# Patient Record
Sex: MALE | Race: WHITE | Employment: OTHER | URBAN - NONMETROPOLITAN AREA
[De-identification: names, ages, dates, MRNs, and addresses within clinical notes are randomized per-mention and may not be internally consistent; named-entity substitution may affect disease eponyms.]

---

## 2018-01-01 ENCOUNTER — ANESTHESIA (OUTPATIENT)
Dept: OPERATING ROOM | Age: 53
DRG: 853 | End: 2018-01-01
Payer: MEDICARE

## 2018-01-01 ENCOUNTER — PREP FOR PROCEDURE (OUTPATIENT)
Dept: SURGERY | Age: 53
End: 2018-01-01

## 2018-01-01 ENCOUNTER — APPOINTMENT (OUTPATIENT)
Dept: GENERAL RADIOLOGY | Age: 53
DRG: 853 | End: 2018-01-01
Attending: SURGERY
Payer: MEDICARE

## 2018-01-01 ENCOUNTER — HOSPITAL ENCOUNTER (INPATIENT)
Age: 53
LOS: 1 days | DRG: 853 | End: 2018-02-13
Attending: SURGERY | Admitting: SURGERY
Payer: MEDICARE

## 2018-01-01 ENCOUNTER — ANESTHESIA EVENT (OUTPATIENT)
Dept: OPERATING ROOM | Age: 53
DRG: 853 | End: 2018-01-01
Payer: MEDICARE

## 2018-01-01 VITALS
OXYGEN SATURATION: 100 % | DIASTOLIC BLOOD PRESSURE: 79 MMHG | RESPIRATION RATE: 16 BRPM | TEMPERATURE: 95.8 F | SYSTOLIC BLOOD PRESSURE: 123 MMHG

## 2018-01-01 VITALS
HEIGHT: 68 IN | OXYGEN SATURATION: 75 % | BODY MASS INDEX: 35.83 KG/M2 | WEIGHT: 236.4 LBS | DIASTOLIC BLOOD PRESSURE: 37 MMHG | TEMPERATURE: 99.1 F | SYSTOLIC BLOOD PRESSURE: 72 MMHG | RESPIRATION RATE: 22 BRPM

## 2018-01-01 LAB
ABO/RH: NORMAL
ALBUMIN SERPL-MCNC: 1.4 G/DL (ref 3.5–5.2)
ALBUMIN SERPL-MCNC: 1.4 G/DL (ref 3.5–5.2)
ALBUMIN SERPL-MCNC: 2.6 G/DL (ref 3.5–5.2)
ALP BLD-CCNC: 100 U/L (ref 40–130)
ALP BLD-CCNC: 123 U/L (ref 40–130)
ALP BLD-CCNC: 76 U/L (ref 40–130)
ALT SERPL-CCNC: 1510 U/L (ref 5–41)
ALT SERPL-CCNC: 3963 U/L (ref 5–41)
ALT SERPL-CCNC: 70 U/L (ref 5–41)
ANION GAP SERPL CALCULATED.3IONS-SCNC: 17 MMOL/L (ref 7–19)
ANION GAP SERPL CALCULATED.3IONS-SCNC: 18 MMOL/L (ref 7–19)
ANION GAP SERPL CALCULATED.3IONS-SCNC: 20 MMOL/L (ref 7–19)
ANTIBODY SCREEN: NORMAL
AST SERPL-CCNC: 2227 U/L (ref 5–40)
AST SERPL-CCNC: 6977 U/L (ref 5–40)
AST SERPL-CCNC: 97 U/L (ref 5–40)
ATYPICAL LYMPHOCYTE RELATIVE PERCENT: 3 % (ref 0–8)
BACTERIA: ABNORMAL /HPF
BANDED NEUTROPHILS RELATIVE PERCENT: 33 % (ref 0–5)
BASE EXCESS ARTERIAL: -10.2 MMOL/L (ref -2–2)
BASE EXCESS ARTERIAL: -11 (ref -3–3)
BASE EXCESS ARTERIAL: -12 (ref -3–3)
BASE EXCESS ARTERIAL: -12.5 MMOL/L (ref -2–2)
BASE EXCESS ARTERIAL: -12.7 MMOL/L (ref -2–2)
BASE EXCESS ARTERIAL: -14.1 MMOL/L (ref -2–2)
BASE EXCESS ARTERIAL: -6 (ref -3–3)
BASE EXCESS ARTERIAL: -7.7 MMOL/L (ref -2–2)
BASE EXCESS ARTERIAL: -8 MMOL/L (ref -2–2)
BASE EXCESS ARTERIAL: -8.1 MMOL/L (ref -2–2)
BASE EXCESS ARTERIAL: -8.1 MMOL/L (ref -2–2)
BASE EXCESS ARTERIAL: -8.7 MMOL/L (ref -2–2)
BASE EXCESS ARTERIAL: -9.2 MMOL/L (ref -2–2)
BASOPHILS ABSOLUTE: 0 K/UL (ref 0–0.2)
BASOPHILS ABSOLUTE: 0.1 K/UL (ref 0–0.2)
BASOPHILS MANUAL: 0 %
BASOPHILS RELATIVE PERCENT: 0 % (ref 0–1)
BASOPHILS RELATIVE PERCENT: 0.4 % (ref 0–1)
BILIRUB SERPL-MCNC: 0.9 MG/DL (ref 0.2–1.2)
BILIRUB SERPL-MCNC: 1.8 MG/DL (ref 0.2–1.2)
BILIRUB SERPL-MCNC: 2 MG/DL (ref 0.2–1.2)
BILIRUBIN URINE: ABNORMAL
BLOOD, URINE: ABNORMAL
BUN BLDV-MCNC: 40 MG/DL (ref 6–20)
BUN BLDV-MCNC: 43 MG/DL (ref 6–20)
BUN BLDV-MCNC: 46 MG/DL (ref 6–20)
BUN BLDV-MCNC: 47 MG/DL (ref 6–20)
BUN BLDV-MCNC: 50 MG/DL (ref 6–20)
BURR CELLS: ABNORMAL
CALCIUM IONIZED: 0.84 MMOL/L (ref 1.1–1.3)
CALCIUM IONIZED: 1.08 MMOL/L (ref 1.1–1.3)
CALCIUM IONIZED: 1.09 MMOL/L (ref 1.1–1.3)
CALCIUM SERPL-MCNC: 5.9 MG/DL (ref 8.6–10)
CALCIUM SERPL-MCNC: 5.9 MG/DL (ref 8.6–10)
CALCIUM SERPL-MCNC: 6.3 MG/DL (ref 8.6–10)
CALCIUM SERPL-MCNC: 7.4 MG/DL (ref 8.6–10)
CALCIUM SERPL-MCNC: 8.2 MG/DL (ref 8.6–10)
CARBOXYHEMOGLOBIN ARTERIAL: 0.4 % (ref 0–5)
CARBOXYHEMOGLOBIN ARTERIAL: 0.5 % (ref 0–5)
CARBOXYHEMOGLOBIN ARTERIAL: 0.5 % (ref 0–5)
CARBOXYHEMOGLOBIN ARTERIAL: 0.6 % (ref 0–5)
CARBOXYHEMOGLOBIN ARTERIAL: 0.6 % (ref 0–5)
CARBOXYHEMOGLOBIN ARTERIAL: 0.7 % (ref 0–5)
CARBOXYHEMOGLOBIN ARTERIAL: 0.8 % (ref 0–5)
CARBOXYHEMOGLOBIN ARTERIAL: 0.8 % (ref 0–5)
CARBOXYHEMOGLOBIN ARTERIAL: 1.4 % (ref 0–5)
CARBOXYHEMOGLOBIN ARTERIAL: 1.6 % (ref 0–5)
CHLORIDE BLD-SCNC: 84 MMOL/L (ref 98–111)
CHLORIDE BLD-SCNC: 87 MMOL/L (ref 98–111)
CHLORIDE BLD-SCNC: 90 MMOL/L (ref 98–111)
CHLORIDE BLD-SCNC: 94 MMOL/L (ref 98–111)
CHLORIDE BLD-SCNC: 96 MMOL/L (ref 98–111)
CLARITY: ABNORMAL
CO2: 17 MEQ/L (ref 21–32)
CO2: 18 MMOL/L (ref 22–29)
CO2: 19 MEQ/L (ref 21–32)
CO2: 19 MMOL/L (ref 22–29)
CO2: 19 MMOL/L (ref 22–29)
CO2: 20 MMOL/L (ref 22–29)
CO2: 22 MEQ/L (ref 21–32)
CO2: 24 MMOL/L (ref 22–29)
COLOR: ABNORMAL
CREAT SERPL-MCNC: 1.4 MG/DL (ref 0.5–1.2)
CREAT SERPL-MCNC: 1.5 MG/DL (ref 0.5–1.2)
CREAT SERPL-MCNC: 1.9 MG/DL (ref 0.5–1.2)
CREAT SERPL-MCNC: 2.4 MG/DL (ref 0.5–1.2)
CREAT SERPL-MCNC: 2.7 MG/DL (ref 0.5–1.2)
DOHLE BODIES: ABNORMAL
EOSINOPHILS ABSOLUTE: 0 K/UL (ref 0–0.6)
EOSINOPHILS ABSOLUTE: 0 K/UL (ref 0–0.6)
EOSINOPHILS RELATIVE PERCENT: 0 % (ref 0–5)
EOSINOPHILS RELATIVE PERCENT: 0 % (ref 0–5)
EPITHELIAL CELLS, UA: ABNORMAL /HPF
GFR NON-AFRICAN AMERICAN: 24
GFR NON-AFRICAN AMERICAN: 25
GFR NON-AFRICAN AMERICAN: 29
GFR NON-AFRICAN AMERICAN: 33
GFR NON-AFRICAN AMERICAN: 38
GFR NON-AFRICAN AMERICAN: 49
GFR NON-AFRICAN AMERICAN: 53
GLUCOSE BLD-MCNC: 100 MG/DL (ref 70–99)
GLUCOSE BLD-MCNC: 102 MG/DL (ref 70–99)
GLUCOSE BLD-MCNC: 103 MG/DL (ref 70–99)
GLUCOSE BLD-MCNC: 109 MG/DL (ref 70–99)
GLUCOSE BLD-MCNC: 110 MG/DL (ref 70–99)
GLUCOSE BLD-MCNC: 117 MG/DL (ref 74–109)
GLUCOSE BLD-MCNC: 126 MG/DL (ref 70–99)
GLUCOSE BLD-MCNC: 128 MG/DL (ref 70–99)
GLUCOSE BLD-MCNC: 129 MG/DL (ref 74–109)
GLUCOSE BLD-MCNC: 145 MG/DL (ref 74–109)
GLUCOSE BLD-MCNC: 170 MG/DL (ref 70–99)
GLUCOSE BLD-MCNC: 211 MG/DL (ref 74–109)
GLUCOSE BLD-MCNC: 56 MG/DL (ref 70–99)
GLUCOSE BLD-MCNC: 61 MG/DL (ref 70–99)
GLUCOSE BLD-MCNC: 65 MG/DL (ref 70–99)
GLUCOSE BLD-MCNC: 68 MG/DL (ref 70–99)
GLUCOSE BLD-MCNC: 70 MG/DL (ref 70–99)
GLUCOSE BLD-MCNC: 70 MG/DL (ref 74–109)
GLUCOSE BLD-MCNC: 79 MG/DL (ref 70–99)
GLUCOSE BLD-MCNC: 82 MG/DL (ref 70–99)
GLUCOSE BLD-MCNC: 88 MG/DL (ref 70–99)
GLUCOSE BLD-MCNC: 90 MG/DL (ref 70–99)
GLUCOSE BLD-MCNC: 91 MG/DL (ref 70–99)
GLUCOSE BLD-MCNC: 94 MG/DL (ref 70–99)
GLUCOSE URINE: NEGATIVE MG/DL
HCO3 ARTERIAL: 16 MMOL/L (ref 22–26)
HCO3 ARTERIAL: 16.3 MMOL/L (ref 22–26)
HCO3 ARTERIAL: 17.8 MMOL/L (ref 22–26)
HCO3 ARTERIAL: 17.9 MMOL/L (ref 22–26)
HCO3 ARTERIAL: 18.8 MMOL/L (ref 22–26)
HCO3 ARTERIAL: 19.2 MMOL/L (ref 22–26)
HCO3 ARTERIAL: 19.3 MMOL/L (ref 22–26)
HCO3 ARTERIAL: 19.9 MMOL/L (ref 22–26)
HCO3 ARTERIAL: 20.5 MMOL/L (ref 22–26)
HCO3 ARTERIAL: 22.2 MMOL/L (ref 22–26)
HCT VFR BLD CALC: 27.5 % (ref 42–52)
HCT VFR BLD CALC: 28.9 % (ref 42–52)
HCT VFR BLD CALC: 38 % (ref 42–52)
HCT VFR BLD CALC: 44.3 % (ref 42–52)
HEMOGLOBIN, ART, EXTENDED: 10.5 G/DL (ref 14–18)
HEMOGLOBIN, ART, EXTENDED: 10.6 G/DL (ref 14–18)
HEMOGLOBIN, ART, EXTENDED: 10.8 G/DL (ref 14–18)
HEMOGLOBIN, ART, EXTENDED: 11 G/DL (ref 14–18)
HEMOGLOBIN, ART, EXTENDED: 12.4 G/DL (ref 14–18)
HEMOGLOBIN, ART, EXTENDED: 14.8 G/DL (ref 14–18)
HEMOGLOBIN, ART, EXTENDED: 17 G/DL (ref 14–18)
HEMOGLOBIN, ART, EXTENDED: 8.6 G/DL (ref 14–18)
HEMOGLOBIN, ART, EXTENDED: 9 G/DL (ref 14–18)
HEMOGLOBIN, ART, EXTENDED: 9.1 G/DL (ref 14–18)
HEMOGLOBIN: 10.1 GM/DL (ref 12–18)
HEMOGLOBIN: 11.8 G/DL (ref 14–18)
HEMOGLOBIN: 13.8 G/DL (ref 14–18)
HEMOGLOBIN: 14 GM/DL (ref 12–18)
HEMOGLOBIN: 7.2 GM/DL (ref 12–18)
HEMOGLOBIN: 8.5 G/DL (ref 14–18)
HEMOGLOBIN: 8.7 G/DL (ref 14–18)
HYPOCHROMIA: ABNORMAL
KETONES, URINE: ABNORMAL MG/DL
LACTIC ACID: 11.9 MMOL/L (ref 0.5–1.9)
LACTIC ACID: 13.6 MMOL/L (ref 0.5–1.9)
LACTIC ACID: 8.7 MMOL/L (ref 0.5–1.9)
LACTIC ACID: 9.6 MMOL/L (ref 0.5–1.9)
LEUKOCYTE ESTERASE, URINE: ABNORMAL
LYMPHOCYTES ABSOLUTE: 1.5 K/UL (ref 1.1–4.5)
LYMPHOCYTES ABSOLUTE: 1.7 K/UL (ref 1.1–4.5)
LYMPHOCYTES RELATIVE PERCENT: 12 % (ref 20–40)
LYMPHOCYTES RELATIVE PERCENT: 5.8 % (ref 20–40)
MAGNESIUM: 5.2 MG/DL (ref 1.6–2.6)
MCH RBC QN AUTO: 26.9 PG (ref 27–31)
MCH RBC QN AUTO: 27.4 PG (ref 27–31)
MCH RBC QN AUTO: 27.4 PG (ref 27–31)
MCH RBC QN AUTO: 27.6 PG (ref 27–31)
MCHC RBC AUTO-ENTMCNC: 30.1 G/DL (ref 33–37)
MCHC RBC AUTO-ENTMCNC: 30.9 G/DL (ref 33–37)
MCHC RBC AUTO-ENTMCNC: 31.1 G/DL (ref 33–37)
MCHC RBC AUTO-ENTMCNC: 31.2 G/DL (ref 33–37)
MCV RBC AUTO: 88.1 FL (ref 80–94)
MCV RBC AUTO: 88.2 FL (ref 80–94)
MCV RBC AUTO: 89.3 FL (ref 80–94)
MCV RBC AUTO: 89.5 FL (ref 80–94)
METHEMOGLOBIN ARTERIAL: 2.5 %
METHEMOGLOBIN ARTERIAL: 2.8 %
METHEMOGLOBIN ARTERIAL: 3.2 %
METHEMOGLOBIN ARTERIAL: 3.3 %
METHEMOGLOBIN ARTERIAL: 3.5 %
METHEMOGLOBIN ARTERIAL: 3.5 %
METHEMOGLOBIN ARTERIAL: 3.7 %
METHEMOGLOBIN ARTERIAL: 3.9 %
METHEMOGLOBIN ARTERIAL: 3.9 %
METHEMOGLOBIN ARTERIAL: 4.1 %
MONOCYTES ABSOLUTE: 0.3 K/UL (ref 0–0.9)
MONOCYTES ABSOLUTE: 0.9 K/UL (ref 0–0.9)
MONOCYTES RELATIVE PERCENT: 3 % (ref 0–10)
MONOCYTES RELATIVE PERCENT: 3.3 % (ref 0–10)
NEUTROPHILS ABSOLUTE: 23.3 K/UL (ref 1.5–7.5)
NEUTROPHILS ABSOLUTE: 9.4 K/UL (ref 1.5–7.5)
NEUTROPHILS MANUAL: 49 %
NEUTROPHILS RELATIVE PERCENT: 49 % (ref 50–65)
NEUTROPHILS RELATIVE PERCENT: 89.4 % (ref 50–65)
NITRITE, URINE: POSITIVE
O2 CONTENT ARTERIAL: 10.2 ML/DL
O2 CONTENT ARTERIAL: 11.6 ML/DL
O2 CONTENT ARTERIAL: 12 ML/DL
O2 CONTENT ARTERIAL: 12.9 ML/DL
O2 CONTENT ARTERIAL: 13.6 ML/DL
O2 CONTENT ARTERIAL: 13.9 ML/DL
O2 CONTENT ARTERIAL: 14.1 ML/DL
O2 CONTENT ARTERIAL: 20 ML/DL
O2 CONTENT ARTERIAL: 21.5 ML/DL
O2 CONTENT ARTERIAL: 7.6 ML/DL
O2 SAT, ARTERIAL: 100 % (ref 93–100)
O2 SAT, ARTERIAL: 100 % (ref 93–100)
O2 SAT, ARTERIAL: 59 %
O2 SAT, ARTERIAL: 79.5 %
O2 SAT, ARTERIAL: 80.2 %
O2 SAT, ARTERIAL: 81.1 %
O2 SAT, ARTERIAL: 85 %
O2 SAT, ARTERIAL: 90 %
O2 SAT, ARTERIAL: 90.4 %
O2 SAT, ARTERIAL: 90.7 %
O2 SAT, ARTERIAL: 92.8 %
O2 SAT, ARTERIAL: 93.2 %
O2 SAT, ARTERIAL: 99 % (ref 93–100)
O2 THERAPY: ABNORMAL
PCO2 ARTERIAL: 44 MM HG (ref 35–48)
PCO2 ARTERIAL: 46 MMHG (ref 35–45)
PCO2 ARTERIAL: 47 MMHG (ref 35–45)
PCO2 ARTERIAL: 48 MMHG (ref 35–45)
PCO2 ARTERIAL: 50 MMHG (ref 35–45)
PCO2 ARTERIAL: 51 MMHG (ref 35–45)
PCO2 ARTERIAL: 52 MM HG (ref 35–48)
PCO2 ARTERIAL: 55 MMHG (ref 35–45)
PCO2 ARTERIAL: 66 MMHG (ref 35–45)
PCO2 ARTERIAL: 67 MM HG (ref 35–48)
PCO2 ARTERIAL: 70 MMHG (ref 35–45)
PDW BLD-RTO: 14.5 % (ref 11.5–14.5)
PDW BLD-RTO: 14.5 % (ref 11.5–14.5)
PDW BLD-RTO: 14.7 % (ref 11.5–14.5)
PDW BLD-RTO: 14.9 % (ref 11.5–14.5)
PERFORMED ON: ABNORMAL
PERFORMED ON: NORMAL
PH ARTERIAL: 7.04 (ref 7.35–7.45)
PH ARTERIAL: 7.07 (ref 7.3–7.5)
PH ARTERIAL: 7.11 (ref 7.35–7.45)
PH ARTERIAL: 7.12 (ref 7.35–7.45)
PH ARTERIAL: 7.13 (ref 7.35–7.45)
PH ARTERIAL: 7.18 (ref 7.35–7.45)
PH ARTERIAL: 7.18 (ref 7.35–7.45)
PH ARTERIAL: 7.19 (ref 7.3–7.5)
PH ARTERIAL: 7.2 (ref 7.35–7.45)
PH ARTERIAL: 7.2 (ref 7.35–7.45)
PH ARTERIAL: 7.22 (ref 7.35–7.45)
PH ARTERIAL: 7.23 (ref 7.35–7.45)
PH ARTERIAL: 7.24 (ref 7.3–7.5)
PH UA: 6
PHOSPHORUS: 10.7 MG/DL (ref 2.5–4.5)
PLATELET # BLD: 112 K/UL (ref 130–400)
PLATELET # BLD: 148 K/UL (ref 130–400)
PLATELET # BLD: 248 K/UL (ref 130–400)
PLATELET # BLD: 68 K/UL (ref 130–400)
PLATELET SLIDE REVIEW: ADEQUATE
PMV BLD AUTO: 11 FL (ref 9.4–12.4)
PMV BLD AUTO: 11.4 FL (ref 9.4–12.4)
PMV BLD AUTO: 11.7 FL (ref 9.4–12.4)
PMV BLD AUTO: 12.3 FL (ref 9.4–12.4)
PO2 ARTERIAL: 157 MMHG (ref 80–100)
PO2 ARTERIAL: 206 MM HG (ref 83–108)
PO2 ARTERIAL: 252 MMHG (ref 80–100)
PO2 ARTERIAL: 291 MM HG (ref 83–108)
PO2 ARTERIAL: 34 MMHG (ref 80–100)
PO2 ARTERIAL: 420 MM HG (ref 83–108)
PO2 ARTERIAL: 48 MMHG (ref 80–100)
PO2 ARTERIAL: 56 MMHG (ref 80–100)
PO2 ARTERIAL: 79 MMHG (ref 80–100)
PO2 ARTERIAL: 81 MMHG (ref 80–100)
PO2 ARTERIAL: 89 MMHG (ref 80–100)
POC ANION GAP: 11
POC ANION GAP: 15
POC ANION GAP: 16
POC CHLORIDE: 100 MEQ/L (ref 99–110)
POC CHLORIDE: 93 MEQ/L (ref 99–110)
POC CHLORIDE: 94 MEQ/L (ref 99–110)
POC CREATININE: 2.1 MG/DL (ref 0.3–1.3)
POC CREATININE: 2.8 MG/DL (ref 0.3–1.3)
POC HEMATOCRIT: 21 % (ref 37–52)
POC HEMATOCRIT: 30 % (ref 37–52)
POC HEMATOCRIT: 41 % (ref 37–52)
POC POTASSIUM: 5.6 MEQ/L (ref 3.5–5.1)
POC POTASSIUM: 5.9 MEQ/L (ref 3.5–5.1)
POC POTASSIUM: 6 MEQ/L (ref 3.5–5.1)
POC SAMPLE TYPE: ABNORMAL
POC SODIUM: 124 MEQ/L (ref 136–145)
POC SODIUM: 130 MEQ/L (ref 136–145)
POC SODIUM: 133 MEQ/L (ref 136–145)
POIKILOCYTES: ABNORMAL
POTASSIUM REFLEX MAGNESIUM: 6 MMOL/L (ref 3.5–5)
POTASSIUM SERPL-SCNC: 5.2 MMOL/L (ref 3.5–5)
POTASSIUM SERPL-SCNC: 5.4 MMOL/L (ref 3.5–5)
POTASSIUM SERPL-SCNC: 6.1 MMOL/L (ref 3.5–5)
POTASSIUM SERPL-SCNC: 6.6 MMOL/L (ref 3.5–5)
POTASSIUM, WHOLE BLOOD: 4.5
POTASSIUM, WHOLE BLOOD: 4.9
POTASSIUM, WHOLE BLOOD: 4.9
POTASSIUM, WHOLE BLOOD: 5
POTASSIUM, WHOLE BLOOD: 5
POTASSIUM, WHOLE BLOOD: 5.4
POTASSIUM, WHOLE BLOOD: 5.8
POTASSIUM, WHOLE BLOOD: 5.9
POTASSIUM, WHOLE BLOOD: 6.1
POTASSIUM, WHOLE BLOOD: 7.3
PROTEIN UA: 100 MG/DL
RBC # BLD: 3.08 M/UL (ref 4.7–6.1)
RBC # BLD: 3.23 M/UL (ref 4.7–6.1)
RBC # BLD: 4.31 M/UL (ref 4.7–6.1)
RBC # BLD: 5.03 M/UL (ref 4.7–6.1)
RBC UA: ABNORMAL /HPF (ref 0–2)
RENAL EPITHELIAL, UA: ABNORMAL /HPF
SODIUM BLD-SCNC: 126 MMOL/L (ref 136–145)
SODIUM BLD-SCNC: 126 MMOL/L (ref 136–145)
SODIUM BLD-SCNC: 128 MMOL/L (ref 136–145)
SODIUM BLD-SCNC: 130 MMOL/L (ref 136–145)
SODIUM BLD-SCNC: 132 MMOL/L (ref 136–145)
SPECIFIC GRAVITY UA: 1.03
TCO2 ARTERIAL: 18 MMOL/L
TCO2 ARTERIAL: 21 MMOL/L
TCO2 ARTERIAL: 23 MMOL/L
TOTAL PROTEIN: 2.5 G/DL (ref 6.6–8.7)
TOTAL PROTEIN: 3.2 G/DL (ref 6.6–8.7)
TOTAL PROTEIN: 3.2 G/DL (ref 6.6–8.7)
TOXIC GRANULATION: ABNORMAL
UROBILINOGEN, URINE: 2 E.U./DL
WBC # BLD: 11.5 K/UL (ref 4.8–10.8)
WBC # BLD: 19.8 K/UL (ref 4.8–10.8)
WBC # BLD: 26.1 K/UL (ref 4.8–10.8)
WBC # BLD: 29 K/UL (ref 4.8–10.8)
WBC UA: ABNORMAL /HPF (ref 0–5)

## 2018-01-01 PROCEDURE — 92950 HEART/LUNG RESUSCITATION CPR: CPT

## 2018-01-01 PROCEDURE — 2500000003 HC RX 250 WO HCPCS: Performed by: NURSE ANESTHETIST, CERTIFIED REGISTERED

## 2018-01-01 PROCEDURE — 86901 BLOOD TYPING SEROLOGIC RH(D): CPT

## 2018-01-01 PROCEDURE — 36415 COLL VENOUS BLD VENIPUNCTURE: CPT

## 2018-01-01 PROCEDURE — 86850 RBC ANTIBODY SCREEN: CPT

## 2018-01-01 PROCEDURE — 2780000010 HC IMPLANT OTHER: Performed by: SURGERY

## 2018-01-01 PROCEDURE — 6370000000 HC RX 637 (ALT 250 FOR IP): Performed by: NURSE ANESTHETIST, CERTIFIED REGISTERED

## 2018-01-01 PROCEDURE — 94002 VENT MGMT INPAT INIT DAY: CPT

## 2018-01-01 PROCEDURE — 2500000003 HC RX 250 WO HCPCS: Performed by: SURGERY

## 2018-01-01 PROCEDURE — 6360000002 HC RX W HCPCS: Performed by: SURGERY

## 2018-01-01 PROCEDURE — 2580000003 HC RX 258

## 2018-01-01 PROCEDURE — 82800 BLOOD PH: CPT

## 2018-01-01 PROCEDURE — 36600 WITHDRAWAL OF ARTERIAL BLOOD: CPT

## 2018-01-01 PROCEDURE — 84100 ASSAY OF PHOSPHORUS: CPT

## 2018-01-01 PROCEDURE — 3600000004 HC SURGERY LEVEL 4 BASE: Performed by: SURGERY

## 2018-01-01 PROCEDURE — 2580000003 HC RX 258: Performed by: HOSPITALIST

## 2018-01-01 PROCEDURE — 84132 ASSAY OF SERUM POTASSIUM: CPT

## 2018-01-01 PROCEDURE — P9047 ALBUMIN (HUMAN), 25%, 50ML: HCPCS

## 2018-01-01 PROCEDURE — 44150 REMOVAL OF COLON: CPT | Performed by: SURGERY

## 2018-01-01 PROCEDURE — 88309 TISSUE EXAM BY PATHOLOGIST: CPT

## 2018-01-01 PROCEDURE — 2580000003 HC RX 258: Performed by: INTERNAL MEDICINE

## 2018-01-01 PROCEDURE — 0DTK0ZZ RESECTION OF ASCENDING COLON, OPEN APPROACH: ICD-10-PCS | Performed by: SURGERY

## 2018-01-01 PROCEDURE — 0DTL0ZZ RESECTION OF TRANSVERSE COLON, OPEN APPROACH: ICD-10-PCS | Performed by: SURGERY

## 2018-01-01 PROCEDURE — 6360000002 HC RX W HCPCS

## 2018-01-01 PROCEDURE — P9047 ALBUMIN (HUMAN), 25%, 50ML: HCPCS | Performed by: INTERNAL MEDICINE

## 2018-01-01 PROCEDURE — 82948 REAGENT STRIP/BLOOD GLUCOSE: CPT

## 2018-01-01 PROCEDURE — 2700000000 HC OXYGEN THERAPY PER DAY

## 2018-01-01 PROCEDURE — 86900 BLOOD TYPING SEROLOGIC ABO: CPT

## 2018-01-01 PROCEDURE — 6360000002 HC RX W HCPCS: Performed by: INTERNAL MEDICINE

## 2018-01-01 PROCEDURE — 6360000002 HC RX W HCPCS: Performed by: NURSE ANESTHETIST, CERTIFIED REGISTERED

## 2018-01-01 PROCEDURE — 80053 COMPREHEN METABOLIC PANEL: CPT

## 2018-01-01 PROCEDURE — 83605 ASSAY OF LACTIC ACID: CPT

## 2018-01-01 PROCEDURE — P9045 ALBUMIN (HUMAN), 5%, 250 ML: HCPCS | Performed by: NURSE ANESTHETIST, CERTIFIED REGISTERED

## 2018-01-01 PROCEDURE — 6360000002 HC RX W HCPCS: Performed by: HOSPITALIST

## 2018-01-01 PROCEDURE — 82435 ASSAY OF BLOOD CHLORIDE: CPT

## 2018-01-01 PROCEDURE — 99291 CRITICAL CARE FIRST HOUR: CPT | Performed by: HOSPITALIST

## 2018-01-01 PROCEDURE — 2580000003 HC RX 258: Performed by: SURGERY

## 2018-01-01 PROCEDURE — 83735 ASSAY OF MAGNESIUM: CPT

## 2018-01-01 PROCEDURE — 71045 X-RAY EXAM CHEST 1 VIEW: CPT

## 2018-01-01 PROCEDURE — 2000000000 HC ICU R&B

## 2018-01-01 PROCEDURE — 84295 ASSAY OF SERUM SODIUM: CPT

## 2018-01-01 PROCEDURE — 6370000000 HC RX 637 (ALT 250 FOR IP): Performed by: INTERNAL MEDICINE

## 2018-01-01 PROCEDURE — 99291 CRITICAL CARE FIRST HOUR: CPT | Performed by: INTERNAL MEDICINE

## 2018-01-01 PROCEDURE — 82565 ASSAY OF CREATININE: CPT

## 2018-01-01 PROCEDURE — 2500000003 HC RX 250 WO HCPCS: Performed by: INTERNAL MEDICINE

## 2018-01-01 PROCEDURE — 82330 ASSAY OF CALCIUM: CPT

## 2018-01-01 PROCEDURE — 5A1945Z RESPIRATORY VENTILATION, 24-96 CONSECUTIVE HOURS: ICD-10-PCS | Performed by: INTERNAL MEDICINE

## 2018-01-01 PROCEDURE — 2500000003 HC RX 250 WO HCPCS: Performed by: HOSPITALIST

## 2018-01-01 PROCEDURE — 99024 POSTOP FOLLOW-UP VISIT: CPT | Performed by: SURGERY

## 2018-01-01 PROCEDURE — 3600000014 HC SURGERY LEVEL 4 ADDTL 15MIN: Performed by: SURGERY

## 2018-01-01 PROCEDURE — 3700000000 HC ANESTHESIA ATTENDED CARE: Performed by: SURGERY

## 2018-01-01 PROCEDURE — 0DTH0ZZ RESECTION OF CECUM, OPEN APPROACH: ICD-10-PCS | Performed by: SURGERY

## 2018-01-01 PROCEDURE — 2720000001 HC MISC SURG SUPPLY STERILE $51-500: Performed by: SURGERY

## 2018-01-01 PROCEDURE — 2720000010 HC SURG SUPPLY STERILE: Performed by: SURGERY

## 2018-01-01 PROCEDURE — 81001 URINALYSIS AUTO W/SCOPE: CPT

## 2018-01-01 PROCEDURE — 0DTM0ZZ RESECTION OF DESCENDING COLON, OPEN APPROACH: ICD-10-PCS | Performed by: SURGERY

## 2018-01-01 PROCEDURE — 85014 HEMATOCRIT: CPT

## 2018-01-01 PROCEDURE — 82810 BLOOD GASES O2 SAT ONLY: CPT

## 2018-01-01 PROCEDURE — 85027 COMPLETE CBC AUTOMATED: CPT

## 2018-01-01 PROCEDURE — 82803 BLOOD GASES ANY COMBINATION: CPT

## 2018-01-01 PROCEDURE — 99223 1ST HOSP IP/OBS HIGH 75: CPT | Performed by: SURGERY

## 2018-01-01 PROCEDURE — 94003 VENT MGMT INPAT SUBQ DAY: CPT

## 2018-01-01 PROCEDURE — 2580000003 HC RX 258: Performed by: NURSE ANESTHETIST, CERTIFIED REGISTERED

## 2018-01-01 PROCEDURE — 3700000001 HC ADD 15 MINUTES (ANESTHESIA): Performed by: SURGERY

## 2018-01-01 PROCEDURE — 82374 ASSAY BLOOD CARBON DIOXIDE: CPT

## 2018-01-01 PROCEDURE — 85025 COMPLETE CBC W/AUTO DIFF WBC: CPT

## 2018-01-01 PROCEDURE — C9113 INJ PANTOPRAZOLE SODIUM, VIA: HCPCS | Performed by: INTERNAL MEDICINE

## 2018-01-01 RX ORDER — NICOTINE POLACRILEX 4 MG
15 LOZENGE BUCCAL PRN
Status: DISCONTINUED | OUTPATIENT
Start: 2018-01-01 | End: 2018-01-01 | Stop reason: HOSPADM

## 2018-01-01 RX ORDER — ONDANSETRON 2 MG/ML
4 INJECTION INTRAMUSCULAR; INTRAVENOUS EVERY 6 HOURS PRN
Status: DISCONTINUED | OUTPATIENT
Start: 2018-01-01 | End: 2018-01-01 | Stop reason: HOSPADM

## 2018-01-01 RX ORDER — ALBUMIN (HUMAN) 12.5 G/50ML
SOLUTION INTRAVENOUS
Status: DISCONTINUED
Start: 2018-01-01 | End: 2018-01-01

## 2018-01-01 RX ORDER — DEXTROSE MONOHYDRATE 25 G/50ML
25 INJECTION, SOLUTION INTRAVENOUS ONCE
Status: DISCONTINUED | OUTPATIENT
Start: 2018-01-01 | End: 2018-01-01 | Stop reason: HOSPADM

## 2018-01-01 RX ORDER — MORPHINE SULFATE 4 MG/ML
2 INJECTION, SOLUTION INTRAMUSCULAR; INTRAVENOUS EVERY 30 MIN PRN
Status: DISCONTINUED | OUTPATIENT
Start: 2018-01-01 | End: 2018-01-01 | Stop reason: HOSPADM

## 2018-01-01 RX ORDER — 0.9 % SODIUM CHLORIDE 0.9 %
500 INTRAVENOUS SOLUTION INTRAVENOUS ONCE
Status: COMPLETED | OUTPATIENT
Start: 2018-01-01 | End: 2018-01-01

## 2018-01-01 RX ORDER — DOPAMINE HYDROCHLORIDE 160 MG/100ML
2.5 INJECTION, SOLUTION INTRAVENOUS CONTINUOUS
Status: DISCONTINUED | OUTPATIENT
Start: 2018-01-01 | End: 2018-01-01 | Stop reason: HOSPADM

## 2018-01-01 RX ORDER — SODIUM CHLORIDE 9 MG/ML
INJECTION, SOLUTION INTRAVENOUS CONTINUOUS PRN
Status: DISCONTINUED | OUTPATIENT
Start: 2018-01-01 | End: 2018-01-01 | Stop reason: SDUPTHER

## 2018-01-01 RX ORDER — ALBUMIN, HUMAN INJ 5% 5 %
SOLUTION INTRAVENOUS PRN
Status: DISCONTINUED | OUTPATIENT
Start: 2018-01-01 | End: 2018-01-01 | Stop reason: SDUPTHER

## 2018-01-01 RX ORDER — CALCIUM CHLORIDE 100 MG/ML
INJECTION INTRAVENOUS; INTRAVENTRICULAR PRN
Status: DISCONTINUED | OUTPATIENT
Start: 2018-01-01 | End: 2018-01-01 | Stop reason: SDUPTHER

## 2018-01-01 RX ORDER — DEXTROSE MONOHYDRATE 25 G/50ML
INJECTION, SOLUTION INTRAVENOUS PRN
Status: DISCONTINUED | OUTPATIENT
Start: 2018-01-01 | End: 2018-01-01 | Stop reason: SDUPTHER

## 2018-01-01 RX ORDER — PANTOPRAZOLE SODIUM 40 MG/10ML
40 INJECTION, POWDER, LYOPHILIZED, FOR SOLUTION INTRAVENOUS DAILY
Status: DISCONTINUED | OUTPATIENT
Start: 2018-01-01 | End: 2018-01-01 | Stop reason: HOSPADM

## 2018-01-01 RX ORDER — IPRATROPIUM BROMIDE AND ALBUTEROL SULFATE 2.5; .5 MG/3ML; MG/3ML
1 SOLUTION RESPIRATORY (INHALATION) EVERY 4 HOURS
Status: DISCONTINUED | OUTPATIENT
Start: 2018-01-01 | End: 2018-01-01 | Stop reason: HOSPADM

## 2018-01-01 RX ORDER — SODIUM CHLORIDE 9 MG/ML
INJECTION, SOLUTION INTRAVENOUS CONTINUOUS
Status: DISCONTINUED | OUTPATIENT
Start: 2018-01-01 | End: 2018-01-01

## 2018-01-01 RX ORDER — DEXTROSE MONOHYDRATE 25 G/50ML
25 INJECTION, SOLUTION INTRAVENOUS ONCE
Status: COMPLETED | OUTPATIENT
Start: 2018-01-01 | End: 2018-01-01

## 2018-01-01 RX ORDER — SODIUM CHLORIDE 0.9 % (FLUSH) 0.9 %
10 SYRINGE (ML) INJECTION EVERY 12 HOURS SCHEDULED
Status: DISCONTINUED | OUTPATIENT
Start: 2018-01-01 | End: 2018-01-01 | Stop reason: HOSPADM

## 2018-01-01 RX ORDER — DIVALPROEX SODIUM 250 MG/1
250 TABLET, DELAYED RELEASE ORAL
Status: ON HOLD | COMMUNITY
End: 2018-01-01 | Stop reason: HOSPADM

## 2018-01-01 RX ORDER — CALCIUM CHLORIDE 100 MG/ML
INJECTION INTRAVENOUS; INTRAVENTRICULAR DAILY PRN
Status: COMPLETED | OUTPATIENT
Start: 2018-01-01 | End: 2018-01-01

## 2018-01-01 RX ORDER — PROPOFOL 10 MG/ML
10 INJECTION, EMULSION INTRAVENOUS
Status: DISCONTINUED | OUTPATIENT
Start: 2018-01-01 | End: 2018-01-01 | Stop reason: HOSPADM

## 2018-01-01 RX ORDER — ATORVASTATIN CALCIUM 40 MG/1
40 TABLET, FILM COATED ORAL DAILY
Status: ON HOLD | COMMUNITY
End: 2018-01-01 | Stop reason: HOSPADM

## 2018-01-01 RX ORDER — ACETAMINOPHEN 325 MG/1
650 TABLET ORAL EVERY 4 HOURS PRN
Status: DISCONTINUED | OUTPATIENT
Start: 2018-01-01 | End: 2018-01-01 | Stop reason: HOSPADM

## 2018-01-01 RX ORDER — ALBUMIN (HUMAN) 12.5 G/50ML
50 SOLUTION INTRAVENOUS ONCE
Status: COMPLETED | OUTPATIENT
Start: 2018-01-01 | End: 2018-01-01

## 2018-01-01 RX ORDER — ROCURONIUM BROMIDE 10 MG/ML
INJECTION, SOLUTION INTRAVENOUS PRN
Status: DISCONTINUED | OUTPATIENT
Start: 2018-01-01 | End: 2018-01-01 | Stop reason: SDUPTHER

## 2018-01-01 RX ORDER — DEXTROSE MONOHYDRATE 25 G/50ML
25 INJECTION, SOLUTION INTRAVENOUS PRN
Status: DISCONTINUED | OUTPATIENT
Start: 2018-01-01 | End: 2018-01-01 | Stop reason: HOSPADM

## 2018-01-01 RX ORDER — ALBUMIN (HUMAN) 12.5 G/50ML
SOLUTION INTRAVENOUS
Status: COMPLETED
Start: 2018-01-01 | End: 2018-01-01

## 2018-01-01 RX ORDER — ALBUMIN (HUMAN) 12.5 G/50ML
25 SOLUTION INTRAVENOUS EVERY 6 HOURS
Status: DISCONTINUED | OUTPATIENT
Start: 2018-01-01 | End: 2018-01-01 | Stop reason: HOSPADM

## 2018-01-01 RX ORDER — DEXTROSE MONOHYDRATE 25 G/50ML
INJECTION, SOLUTION INTRAVENOUS DAILY PRN
Status: COMPLETED | OUTPATIENT
Start: 2018-01-01 | End: 2018-01-01

## 2018-01-01 RX ORDER — DEXTROSE MONOHYDRATE 50 MG/ML
100 INJECTION, SOLUTION INTRAVENOUS PRN
Status: DISCONTINUED | OUTPATIENT
Start: 2018-01-01 | End: 2018-01-01 | Stop reason: HOSPADM

## 2018-01-01 RX ORDER — SODIUM CHLORIDE 0.9 % (FLUSH) 0.9 %
10 SYRINGE (ML) INJECTION PRN
Status: DISCONTINUED | OUTPATIENT
Start: 2018-01-01 | End: 2018-01-01 | Stop reason: HOSPADM

## 2018-01-01 RX ORDER — DEXTROSE MONOHYDRATE 25 G/50ML
12.5 INJECTION, SOLUTION INTRAVENOUS PRN
Status: DISCONTINUED | OUTPATIENT
Start: 2018-01-01 | End: 2018-01-01 | Stop reason: HOSPADM

## 2018-01-01 RX ORDER — ALBUTEROL SULFATE 0.63 MG/3ML
1 SOLUTION RESPIRATORY (INHALATION)
Status: ON HOLD | COMMUNITY
End: 2018-01-01 | Stop reason: HOSPADM

## 2018-01-01 RX ORDER — DEXTROSE MONOHYDRATE 25 G/50ML
INJECTION, SOLUTION INTRAVENOUS
Status: COMPLETED
Start: 2018-01-01 | End: 2018-01-01

## 2018-01-01 RX ORDER — DOBUTAMINE HYDROCHLORIDE 200 MG/100ML
2.5 INJECTION INTRAVENOUS CONTINUOUS
Status: DISCONTINUED | OUTPATIENT
Start: 2018-01-01 | End: 2018-01-01 | Stop reason: HOSPADM

## 2018-01-01 RX ORDER — CHLORHEXIDINE GLUCONATE 0.12 MG/ML
15 RINSE ORAL 2 TIMES DAILY
Status: DISCONTINUED | OUTPATIENT
Start: 2018-01-01 | End: 2018-01-01 | Stop reason: HOSPADM

## 2018-01-01 RX ORDER — MIDAZOLAM HYDROCHLORIDE 1 MG/ML
INJECTION INTRAMUSCULAR; INTRAVENOUS PRN
Status: DISCONTINUED | OUTPATIENT
Start: 2018-01-01 | End: 2018-01-01 | Stop reason: SDUPTHER

## 2018-01-01 RX ORDER — PROPOFOL 10 MG/ML
INJECTION, EMULSION INTRAVENOUS
Status: DISCONTINUED
Start: 2018-01-01 | End: 2018-01-01

## 2018-01-01 RX ADMIN — DOPAMINE HYDROCHLORIDE 10 MCG/KG/MIN: 160 INJECTION, SOLUTION INTRAVENOUS at 06:21

## 2018-01-01 RX ADMIN — INSULIN HUMAN 10 UNITS: 100 INJECTION, SOLUTION PARENTERAL at 08:25

## 2018-01-01 RX ADMIN — SODIUM BICARBONATE 50 MEQ: 84 INJECTION, SOLUTION INTRAVENOUS at 07:17

## 2018-01-01 RX ADMIN — Medication 150 MG: at 08:44

## 2018-01-01 RX ADMIN — SODIUM CHLORIDE 1000 MG: 9 INJECTION, SOLUTION INTRAVENOUS at 05:52

## 2018-01-01 RX ADMIN — HYDROCORTISONE SODIUM SUCCINATE 50 MG: 100 INJECTION, POWDER, FOR SOLUTION INTRAMUSCULAR; INTRAVENOUS at 08:45

## 2018-01-01 RX ADMIN — SODIUM BICARBONATE 50 MEQ: 84 INJECTION, SOLUTION INTRAVENOUS at 06:48

## 2018-01-01 RX ADMIN — MIDAZOLAM HYDROCHLORIDE 2 MG: 1 INJECTION, SOLUTION INTRAMUSCULAR; INTRAVENOUS at 08:29

## 2018-01-01 RX ADMIN — ALBUMIN (HUMAN) 250 ML: 2.5 SOLUTION INTRAVENOUS at 06:51

## 2018-01-01 RX ADMIN — CALCIUM CHLORIDE 0.5 G: 100 INJECTION, SOLUTION INTRAVENOUS at 06:46

## 2018-01-01 RX ADMIN — CALCIUM CHLORIDE 1 G: 100 INJECTION, SOLUTION INTRAVENOUS at 08:28

## 2018-01-01 RX ADMIN — Medication 10 ML: at 10:28

## 2018-01-01 RX ADMIN — ALBUMIN (HUMAN) 25 G: 0.25 INJECTION, SOLUTION INTRAVENOUS at 00:53

## 2018-01-01 RX ADMIN — EPINEPHRINE 0.2 MG: 0.1 INJECTION INTRACARDIAC; INTRAVENOUS at 07:18

## 2018-01-01 RX ADMIN — ALBUMIN (HUMAN) 250 ML: 2.5 SOLUTION INTRAVENOUS at 06:32

## 2018-01-01 RX ADMIN — NOREPINEPHRINE BITARTRATE 15 MCG/MIN: 1 INJECTION INTRAVENOUS at 04:06

## 2018-01-01 RX ADMIN — HYDROCORTISONE SODIUM SUCCINATE 50 MG: 100 INJECTION, POWDER, FOR SOLUTION INTRAMUSCULAR; INTRAVENOUS at 03:32

## 2018-01-01 RX ADMIN — SODIUM BICARBONATE: 84 INJECTION, SOLUTION INTRAVENOUS at 04:59

## 2018-01-01 RX ADMIN — EPINEPHRINE 1 MG: 0.1 INJECTION INTRACARDIAC; INTRAVENOUS at 04:34

## 2018-01-01 RX ADMIN — DEXTROSE MONOHYDRATE 25 G: 500 INJECTION PARENTERAL at 12:22

## 2018-01-01 RX ADMIN — SODIUM BICARBONATE 50 MEQ: 84 INJECTION, SOLUTION INTRAVENOUS at 07:30

## 2018-01-01 RX ADMIN — EPINEPHRINE 1 MG: 0.1 INJECTION INTRACARDIAC; INTRAVENOUS at 04:32

## 2018-01-01 RX ADMIN — ALBUMIN (HUMAN) 50 G: 0.25 INJECTION, SOLUTION INTRAVENOUS at 05:43

## 2018-01-01 RX ADMIN — SODIUM BICARBONATE 50 MEQ: 84 INJECTION, SOLUTION INTRAVENOUS at 08:20

## 2018-01-01 RX ADMIN — INSULIN HUMAN 10 UNITS: 100 INJECTION, SOLUTION PARENTERAL at 07:08

## 2018-01-01 RX ADMIN — CALCIUM CHLORIDE 1 G: 100 INJECTION, SOLUTION INTRAVENOUS at 04:37

## 2018-01-01 RX ADMIN — SODIUM CHLORIDE: 9 INJECTION, SOLUTION INTRAVENOUS at 08:15

## 2018-01-01 RX ADMIN — ROCURONIUM BROMIDE 50 MG: 10 INJECTION INTRAVENOUS at 05:59

## 2018-01-01 RX ADMIN — EPINEPHRINE 0.2 MG: 0.1 INJECTION INTRACARDIAC; INTRAVENOUS at 07:52

## 2018-01-01 RX ADMIN — SODIUM CHLORIDE: 9 INJECTION, SOLUTION INTRAVENOUS at 06:01

## 2018-01-01 RX ADMIN — EPINEPHRINE 0.2 MG: 0.1 INJECTION INTRACARDIAC; INTRAVENOUS at 07:41

## 2018-01-01 RX ADMIN — ROCURONIUM BROMIDE 50 MG: 10 INJECTION INTRAVENOUS at 06:55

## 2018-01-01 RX ADMIN — PANTOPRAZOLE SODIUM 40 MG: 40 INJECTION, POWDER, FOR SOLUTION INTRAVENOUS at 08:45

## 2018-01-01 RX ADMIN — EPINEPHRINE 0.2 MG: 0.1 INJECTION INTRACARDIAC; INTRAVENOUS at 07:30

## 2018-01-01 RX ADMIN — ALBUMIN (HUMAN) 50 G: 0.25 INJECTION, SOLUTION INTRAVENOUS at 21:04

## 2018-01-01 RX ADMIN — CHLORHEXIDINE GLUCONATE 15 ML: 1.2 LIQUID BUCCAL at 22:36

## 2018-01-01 RX ADMIN — SODIUM CHLORIDE 1 G: 9 INJECTION, SOLUTION INTRAVENOUS at 06:26

## 2018-01-01 RX ADMIN — NOREPINEPHRINE BITARTRATE 40 MCG/MIN: 1 INJECTION INTRAVENOUS at 03:23

## 2018-01-01 RX ADMIN — DOPAMINE HYDROCHLORIDE 20 MCG/KG/MIN: 160 INJECTION, SOLUTION INTRAVENOUS at 22:35

## 2018-01-01 RX ADMIN — DEXTROSE MONOHYDRATE 12.5 G: 500 INJECTION PARENTERAL at 07:08

## 2018-01-01 RX ADMIN — NOREPINEPHRINE BITARTRATE 40 MCG/MIN: 1 INJECTION INTRAVENOUS at 19:54

## 2018-01-01 RX ADMIN — SODIUM BICARBONATE 25 MEQ: 84 INJECTION, SOLUTION INTRAVENOUS at 04:30

## 2018-01-01 RX ADMIN — PHENYLEPHRINE HYDROCHLORIDE 275 MCG/MIN: 10 INJECTION INTRAVENOUS at 21:20

## 2018-01-01 RX ADMIN — CALCIUM GLUCONATE 2 G: 98 INJECTION, SOLUTION INTRAVENOUS at 01:22

## 2018-01-01 RX ADMIN — CALCIUM CHLORIDE 1 G: 100 INJECTION, SOLUTION INTRAVENOUS at 04:33

## 2018-01-01 RX ADMIN — SODIUM BICARBONATE 50 MEQ: 84 INJECTION INTRAVENOUS at 10:49

## 2018-01-01 RX ADMIN — DOPAMINE HYDROCHLORIDE 20 MCG/KG/MIN: 160 INJECTION, SOLUTION INTRAVENOUS at 02:12

## 2018-01-01 RX ADMIN — DEXTROSE MONOHYDRATE 25 G: 500 INJECTION PARENTERAL at 08:25

## 2018-01-01 RX ADMIN — DOPAMINE HYDROCHLORIDE 2.5 MCG/KG/MIN: 160 INJECTION, SOLUTION INTRAVENOUS at 17:04

## 2018-01-01 RX ADMIN — SODIUM BICARBONATE 50 MEQ: 84 INJECTION, SOLUTION INTRAVENOUS at 04:36

## 2018-01-01 RX ADMIN — VASOPRESSIN 5 UNITS: 20 INJECTION, SOLUTION INTRAMUSCULAR; SUBCUTANEOUS at 06:09

## 2018-01-01 RX ADMIN — CALCIUM CHLORIDE 0.5 G: 100 INJECTION, SOLUTION INTRAVENOUS at 06:59

## 2018-01-01 RX ADMIN — DEXTROSE MONOHYDRATE 25 G: 25 INJECTION, SOLUTION INTRAVENOUS at 04:59

## 2018-01-01 RX ADMIN — MIDAZOLAM HYDROCHLORIDE 2 MG: 1 INJECTION, SOLUTION INTRAMUSCULAR; INTRAVENOUS at 05:58

## 2018-01-01 RX ADMIN — EPINEPHRINE 0.2 MG: 0.1 INJECTION INTRACARDIAC; INTRAVENOUS at 08:10

## 2018-01-01 RX ADMIN — Medication 0.04 UNITS/MIN: at 09:00

## 2018-01-01 RX ADMIN — Medication 150 MG: at 07:23

## 2018-01-01 RX ADMIN — INSULIN HUMAN 10 UNITS: 100 INJECTION, SOLUTION PARENTERAL at 04:58

## 2018-01-01 RX ADMIN — ALBUMIN (HUMAN) 50 G: 12.5 SOLUTION INTRAVENOUS at 05:43

## 2018-01-01 RX ADMIN — PHENYLEPHRINE HYDROCHLORIDE 150 MCG/MIN: 10 INJECTION INTRAVENOUS at 14:07

## 2018-01-01 RX ADMIN — SODIUM CHLORIDE: 9 INJECTION, SOLUTION INTRAVENOUS at 07:17

## 2018-01-01 RX ADMIN — EPINEPHRINE 2 MCG/MIN: 1 INJECTION INTRAMUSCULAR; INTRAVENOUS; SUBCUTANEOUS at 22:00

## 2018-01-01 RX ADMIN — ROCURONIUM BROMIDE 50 MG: 10 INJECTION INTRAVENOUS at 08:28

## 2018-01-01 RX ADMIN — EPINEPHRINE 0.2 MG: 0.1 INJECTION INTRACARDIAC; INTRAVENOUS at 08:05

## 2018-01-01 RX ADMIN — DEXTROSE MONOHYDRATE: 500 INJECTION PARENTERAL at 12:32

## 2018-01-01 RX ADMIN — PHENYLEPHRINE HYDROCHLORIDE 275 MCG/MIN: 10 INJECTION INTRAVENOUS at 17:34

## 2018-01-01 RX ADMIN — SODIUM BICARBONATE 50 MEQ: 84 INJECTION, SOLUTION INTRAVENOUS at 08:29

## 2018-01-01 RX ADMIN — HYDROCORTISONE SODIUM SUCCINATE 50 MG: 100 INJECTION, POWDER, FOR SOLUTION INTRAMUSCULAR; INTRAVENOUS at 21:39

## 2018-01-01 RX ADMIN — DEXTROSE MONOHYDRATE 12.5 G: 500 INJECTION PARENTERAL at 07:30

## 2018-01-01 RX ADMIN — DOBUTAMINE HYDROCHLORIDE 2.5 MCG/KG/MIN: 200 INJECTION INTRAVENOUS at 01:43

## 2018-01-01 RX ADMIN — EPINEPHRINE 0.2 MG: 0.1 INJECTION INTRACARDIAC; INTRAVENOUS at 08:25

## 2018-01-01 RX ADMIN — SODIUM CHLORIDE 500 ML: 9 INJECTION, SOLUTION INTRAVENOUS at 12:36

## 2018-01-01 RX ADMIN — Medication 10 ML: at 22:36

## 2018-01-01 RX ADMIN — CALCIUM CHLORIDE 1 G: 100 INJECTION, SOLUTION INTRAVENOUS at 07:34

## 2018-01-01 RX ADMIN — MIDAZOLAM HYDROCHLORIDE 2 MG: 1 INJECTION, SOLUTION INTRAMUSCULAR; INTRAVENOUS at 07:03

## 2018-01-01 RX ADMIN — SODIUM CHLORIDE 500 ML: 9 INJECTION, SOLUTION INTRAVENOUS at 17:02

## 2018-01-01 RX ADMIN — NOREPINEPHRINE BITARTRATE 40 MCG/MIN: 1 INJECTION INTRAVENOUS at 08:45

## 2018-01-01 RX ADMIN — SODIUM BICARBONATE: 84 INJECTION, SOLUTION INTRAVENOUS at 01:31

## 2018-01-01 RX ADMIN — SODIUM CHLORIDE: 9 INJECTION, SOLUTION INTRAVENOUS at 08:04

## 2018-01-01 RX ADMIN — ALBUMIN (HUMAN) 25 G: 0.25 INJECTION, SOLUTION INTRAVENOUS at 06:23

## 2018-01-01 RX ADMIN — SODIUM BICARBONATE: 84 INJECTION, SOLUTION INTRAVENOUS at 11:27

## 2018-01-01 RX ADMIN — CALCIUM CHLORIDE 0.5 G: 100 INJECTION, SOLUTION INTRAVENOUS at 06:44

## 2018-01-01 RX ADMIN — SODIUM CHLORIDE: 9 INJECTION, SOLUTION INTRAVENOUS at 04:07

## 2018-01-01 RX ADMIN — Medication 0.04 UNITS/MIN: at 14:40

## 2018-01-01 RX ADMIN — CALCIUM CHLORIDE 0.5 G: 100 INJECTION, SOLUTION INTRAVENOUS at 06:58

## 2018-01-01 RX ADMIN — EPINEPHRINE 1 MG: 0.1 INJECTION INTRACARDIAC; INTRAVENOUS at 04:30

## 2018-01-01 RX ADMIN — SODIUM CHLORIDE: 9 INJECTION, SOLUTION INTRAVENOUS at 07:48

## 2018-01-01 RX ADMIN — Medication 5 UNITS/HR: at 06:21

## 2018-01-01 RX ADMIN — DEXTROSE MONOHYDRATE 25 G: 25 INJECTION, SOLUTION INTRAVENOUS at 04:39

## 2018-01-01 RX ADMIN — NOREPINEPHRINE BITARTRATE 30 MCG/MIN: 1 INJECTION INTRAVENOUS at 06:07

## 2018-01-01 ASSESSMENT — PULMONARY FUNCTION TESTS
PIF_VALUE: 28.4
PIF_VALUE: 36.1
PIF_VALUE: 32.3
PIF_VALUE: 31.5
PIF_VALUE: 28.9
PIF_VALUE: 29.6
PIF_VALUE: 28.6
PIF_VALUE: 27.4
PIF_VALUE: 32.4
PIF_VALUE: 27.4
PIF_VALUE: 34.3
PIF_VALUE: 35.5
PIF_VALUE: 35.3
PIF_VALUE: 34.2
PIF_VALUE: 29.4
PIF_VALUE: 28.6
PIF_VALUE: 33.8
PIF_VALUE: 28.9
PIF_VALUE: 35.4
PIF_VALUE: 32.4
PIF_VALUE: 31.3
PIF_VALUE: 29.1
PIF_VALUE: 35.8
PIF_VALUE: 27.4
PIF_VALUE: 28.8
PIF_VALUE: 31.1
PIF_VALUE: 29.2
PIF_VALUE: 36.2
PIF_VALUE: 29.9
PIF_VALUE: 29.8
PIF_VALUE: 29.4
PIF_VALUE: 32.8
PIF_VALUE: 27.3
PIF_VALUE: 29.6
PIF_VALUE: 31.4
PIF_VALUE: 36.1
PIF_VALUE: 29.5
PIF_VALUE: 29.7
PIF_VALUE: 33.6
PIF_VALUE: 29.7
PIF_VALUE: 32
PIF_VALUE: 28.7
PIF_VALUE: 32.7
PIF_VALUE: 34.7
PIF_VALUE: 30.5
PIF_VALUE: 35.4
PIF_VALUE: 29.1
PIF_VALUE: 31.6
PIF_VALUE: 36.5
PIF_VALUE: 32.7
PIF_VALUE: 33.7
PIF_VALUE: 35.4
PIF_VALUE: 29.1
PIF_VALUE: 35.4
PIF_VALUE: 33.5

## 2018-02-12 PROBLEM — C18.7 MALIGNANT NEOPLASM OF SIGMOID COLON (HCC): Status: ACTIVE | Noted: 2018-01-01

## 2018-02-12 PROBLEM — R65.21 SEPTIC SHOCK DUE TO UNDETERMINED ORGANISM (HCC): Status: ACTIVE | Noted: 2018-01-01

## 2018-02-12 PROBLEM — A41.9 SEPTIC SHOCK DUE TO UNDETERMINED ORGANISM (HCC): Status: ACTIVE | Noted: 2018-01-01

## 2018-02-12 PROBLEM — R56.9 SEIZURE (HCC): Status: ACTIVE | Noted: 2018-01-01

## 2018-02-12 PROBLEM — K63.1 PERFORATION BOWEL (HCC): Status: ACTIVE | Noted: 2018-01-01

## 2018-02-12 PROBLEM — J44.9 COPD (CHRONIC OBSTRUCTIVE PULMONARY DISEASE) (HCC): Status: ACTIVE | Noted: 2018-01-01

## 2018-02-12 PROBLEM — J96.21 ACUTE ON CHRONIC RESPIRATORY FAILURE WITH HYPOXIA (HCC): Status: ACTIVE | Noted: 2018-01-01

## 2018-02-12 NOTE — PROGRESS NOTES
Oxygen saturation probe not picking up accurately on this patient. Earlier this afternoon, it was showing 82% on monitor but ABG revealed O2 sat  94%. Dr. Arabella Mcleod aware of difference and O2 reading on monitor.

## 2018-02-12 NOTE — PROGRESS NOTES
Blood gas, arterial [[id number]] (Abnormal) Collected: 02/12/18 0914      Specimen: Blood gases Updated: 02/12/18 0915      pH, Arterial 7.130 (LL)      pCO2, Arterial 48.0 (H) mmHg       pO2, Arterial 157.0 (H) mmHg       HCO3, Arterial 16.0 (L) mmol/L       Base Excess, Arterial -12.5 (L) mmol/L       Hemoglobin, Art, Extended 8.6 (L) g/dL       O2 Sat, Arterial 92.8 %       Carboxyhgb, Arterial 0.8 %       Methemoglobin, Arterial 3.5 (H) %       O2 Content, Arterial 11.6 mL/dL       O2 Therapy Unknown     Narrative:       CALL  [facility] telSEBASTIÁN Hart RN ICU  Sam Portillo RN ICU, 02/12/2018 09:15, by FARIDEH [facility]     Potassium, Whole Blood [[id number]] Collected: 02/12/18 0914      Updated: 02/12/18 0915      Potassium, Whole Blood 5.0     PRVC 15, , 100% FIO2, 8 PEEP  ABG obtained via ART Line

## 2018-02-12 NOTE — ANESTHESIA PROCEDURE NOTES
Central Venous Line:    A central venous line was placed using ultrasound guidance, in the OR for the following indication(s): central venous access and CVP monitoring. 2/12/2018 6:50 AM2/12/2018 6:50 AM    Sterility preparation included the following: hand hygiene performed prior to procedure, maximum sterile barriers used and sterile technique used to drape from head to toe. The patient was placed in Trendelenburg position. The    The site was prepped with Chloraprep. A 8.5 Fr (size), 11.5 (length), introducer single lumen was placed. During the procedure, the following specific steps were taken: target vein identified, needle advanced into vein and blood aspirated and guidewire advanced into vein. Intravenous verification was obtained by ultrasound and venous blood return. Post insertion care included: all ports aspirated, all ports flushed easily, guidewire removed intact, Biopatch applied, line sutured in place and dressing applied. During the procedure the patient experienced: patient tolerated procedure well with no complications.       Insertion site scrubbed per usage guidelines?: Yes  Skin prep agent dried for 3 minutes prior to procedure?:yes  Anesthesia type: general..No  Staffing  Anesthesiologist: Marcin Sage  Performed: Anesthesiologist   Preanesthetic Checklist  Completed: patient identified, site marked, surgical consent, pre-op evaluation, timeout performed, IV checked, risks and benefits discussed, monitors and equipment checked, anesthesia consent given, oxygen available and patient being monitored

## 2018-02-12 NOTE — PROGRESS NOTES
Hospitalist Progress Note  2/12/2018 5:19 PM  Subjective:   Admit Date: 2/12/2018  PCP: Omari Stone  [id number]      Chief Complaint: Unresponsive. Subjective / History of present illness:  Hypothermic, hypoglycemic (despite change from D5 0.45 NS). Maxxed on NE and vasopressin. Not breathing over the vent, requiring no sedation, no cough or gag. Requires 100% oxygen and 8 of PEEP. Requiring jeffrey hugger. Wife is at bedside for discission. Had almost 1 Liter out via ABThera system in 4 hours post op    Cumulative hospital history:   The patient is a 47year old gentleman who for the past 3 months has had increasing abdominal pain. He decline endo/colonoscopy as recommended by his PCP. He developed abdominal distention and increasing pain and had projectile vomiting. CT from [place], [place]. Lastnight he agreed for exploratory laparotomy and underrwent Ex lap with total abdominal colectomy due to finding a completely obstructing rectosigmoid cancer with perforation of a massively distended colon. Fecal peritonitis also seen    ROS:  Unresponsive    14 point review of systems is negative except as specifically addressed above.     Medications:   norepinephrine 40 mcg/min (02/12/18 1058)    propofol      vasopressin infusion 0.04 Units/min (02/12/18 1440)    IV infusion builder 150 mL/hr at 02/12/18 1236    phenylephrine (MITCHELL-SYNEPHRINE) 50mg/250mL infusion 200 mcg/min (02/12/18 1444)    DOPamine 2.5 mcg/kg/min (02/12/18 1704)     Current Facility-Administered Medications   Medication Dose Route Frequency Provider Last Rate Last Dose    norepinephrine (LEVOPHED) 16 mg in dextrose 5 % 250 mL infusion  2 mcg/min Intravenous Continuous Shellie Hills MD 37.5 mL/hr at 02/12/18 1058 40 mcg/min at 02/12/18 1058    propofol 1000 MG/100ML injection  10 mcg/kg/min Intravenous Titrated Shellie Hills MD        ertapenem [facility]) 1 g IVPB minibag  1 g Intravenous Q24H Lukas Caruso MD   1 g at 02/12/18 0626    micafungin (MYCAMINE) 150 mg in sodium chloride 0.9 % 100 mL IVPB  150 mg Intravenous Daily Ruby Louie MD   150 mg at 02/12/18 0723    vasopressin 20 Units in sodium chloride 0.9 % 100 mL infusion  0.04 Units/min Intravenous Continuous Lukas Caruso MD 12 mL/hr at 02/12/18 1440 0.04 Units/min at 02/12/18 1440    chlorhexidine (PERIDEX) 0.12 % solution 15 mL  15 mL Mouth/Throat BID Lukas Caruso MD        pantoprazole (PROTONIX) injection 40 mg  40 mg Intravenous Daily Lukas Caruso MD        sodium chloride flush 0.9 % injection 10 mL  10 mL Intravenous 2 times per day Lennox Blackman MD   10 mL at 02/12/18 1028    sodium chloride flush 0.9 % injection 10 mL  10 mL Intravenous PRN Lennox Blackman MD        acetaminophen (TYLENOL) tablet 650 mg  650 mg Oral Q4H PRN Lennox Blackman MD        morphine injection 2 mg  2 mg Intravenous Q30 Min PRN Lennox Blackman MD        ondansetron [facility]) injection 4 mg  4 mg Intravenous Q6H PRN MD Candelario Harris ON 2/13/2018] enoxaparin (LOVENOX) injection 40 mg  40 mg Subcutaneous Daily Lennox Blackman MD        sodium bicarbonate 100 mEq in dextrose 5 % and 0.45 % NaCl 1,000 mL infusion   Intravenous Continuous Doni Cardona  mL/hr at 02/12/18 1236      phenylephrine (MITCHELL-SYNEPHRINE) 50 mg in dextrose 5 % 250 mL infusion  100 mcg/min Intravenous Continuous Doni Cardona MD 60 mL/hr at 02/12/18 1444 200 mcg/min at 02/12/18 1444    DOPamine (INTROPIN) 400 mg in dextrose 5 % 250 mL infusion  2.5 mcg/kg/min Intravenous Continuous Doni Cardona MD 9.9 mL/hr at 02/12/18 1704 2.5 mcg/kg/min at 02/12/18 1704    0.9 % sodium chloride bolus  500 mL Intravenous Once Doni Cardona MD 1,000 mL/hr at 02/12/18 1702 500 mL at 02/12/18 1702        Objective:   Vitals: BP (!) 84/46   Pulse 84   Temp 96.5 °F (35.8 °C) (Temporal)   Resp 15   Ht 5' 8\" (1.727 m)   Wt 234 lb (106.1 kg)   SpO2 (!) 86%

## 2018-02-12 NOTE — PROGRESS NOTES
Procedure Component Value Units Date/Time     Blood Gas, Arterial [[id number]] (Abnormal) Collected: 02/12/18 0419     Specimen: Blood gases Updated: 02/12/18 0420      pH, Arterial 7.040 (LL)      pCO2, Arterial 66.0 (H) mmHg       pO2, Arterial 89.0 mmHg       HCO3, Arterial 17.8 (L) mmol/L       Base Excess, Arterial -14.1 (L) mmol/L       Hemoglobin, Art, Extended 17.0 g/dL       O2 Sat, Arterial 90.0 %       Carboxyhgb, Arterial 1.6 %       Methemoglobin, Arterial 2.5 %       O2 Content, Arterial 21.5 mL/dL       O2 Therapy Unknown     Narrative:       CALL  [facility] Paulina Damon RN ICU, 02/12/2018 04:20, by Dandre Hughes     Potassium, Whole Blood [[id number]] Collected: 02/12/18 0419      Updated: 02/12/18 0420      Potassium, Whole Blood 7.3     Narrative:       CALL  [facility] Paulina Damon RN ICU, 02/12/2018 04:20, by DICTE         PRVC, 14, 550, PEEP 5, 100%  RIGHT BRACHIAL

## 2018-02-12 NOTE — CODE DOCUMENTATION
Code blue called  Patient on vent bowel perforation   Asystole acls protocol started,   Ca+ given hyperkalemia  Bicarbonate acidosis  d50  Patient regained pulse  And stable bp on levophed.   Will follow in consultation

## 2018-02-12 NOTE — ANESTHESIA PRE PROCEDURE
Department of Anesthesiology  Preprocedure Note       Name:  Dorina Sue   Age:  47 y.o.  :  1965                                          MRN:  [id number]         Date:  2018      Surgeon: Srinivasan Velasquez):  Juvenal Garcia MD    Procedure: Procedure(s):  LAPAROTOMY EXPLORATORY    Medications prior to admission:   Prior to Admission medications    Not on File       Current medications:    Current Facility-Administered Medications   Medication Dose Route Frequency Provider Last Rate Last Dose    norepinephrine (LEVOPHED) 16 mg in dextrose 5 % 250 mL infusion  2 mcg/min Intravenous Continuous Juvenal Garcia MD 14.1 mL/hr at 18 0406 15 mcg/min at 18 0406    propofol 1000 MG/100ML injection  10 mcg/kg/min Intravenous Titrated Juvenal Garcia MD        propofol 1000 MG/100ML injection             sodium bicarbonate 150 mEq in dextrose 5 % 1,000 mL infusion   Intravenous Continuous Gifty Robert  mL/hr at 18 0533      ertapenem (INVANZ) 1 g IVPB minibag  1 g Intravenous Q24H Lukas Caruso MD        micafungin (MYCAMINE) 150 mg in sodium chloride 0.9 % 100 mL IVPB  150 mg Intravenous Daily Gifty Robert MD        vasopressin 20 Units in sodium chloride 0.9 % 100 mL infusion  0.04 Units/min Intravenous Continuous Gifty Robert MD        albumin human 25 % solution                Allergies:  No Known Allergies    Problem List:    Patient Active Problem List   Diagnosis Code    Perforation bowel ([facility] [address] K63.1       Past Medical History:  History reviewed. No pertinent past medical history. Past Surgical History:  History reviewed. No pertinent surgical history.     Social History:    Social History   Substance Use Topics    Smoking status: Not on file    Smokeless tobacco: Not on file    Alcohol use Not on file                                Counseling given: Not Answered      Vital Signs (Current):   Vitals:    18 0413 18 0428 18 0503 18 0508   BP:  104/66 (!) 120/23 (!) 120/23   Pulse:  68 77 75   Resp:  30 21 21   Temp:    95.9 °F (35.5 °C)   TempSrc:    Temporal   SpO2:  93% 97% 97%   Weight: 234 lb (106.1 kg)      Height:    5' 8\" (1.727 m)                                              BP Readings from Last 3 Encounters:   02/12/18 (!) 120/23       NPO Status:                                                                                 BMI:   Wt Readings from Last 3 Encounters:   02/12/18 234 lb (106.1 kg)     Body mass index is 35.58 kg/m².     CBC:   Lab Results   Component Value Date    WBC 19.8 02/12/2018    RBC 5.03 02/12/2018    HGB 13.8 02/12/2018    HCT 44.3 02/12/2018    MCV 88.1 02/12/2018    RDW 14.5 02/12/2018     02/12/2018       CMP:   Lab Results   Component Value Date     02/12/2018    K 6.1 02/12/2018    K 6.1 02/12/2018    CL 84 02/12/2018    CO2 24 02/12/2018    BUN 47 02/12/2018    CREATININE 1.4 02/12/2018    LABGLOM 53 02/12/2018    GLUCOSE 211 02/12/2018    PROT 3.2 02/12/2018    CALCIUM 8.2 02/12/2018    BILITOT 0.9 02/12/2018    ALKPHOS 123 02/12/2018    AST 97 02/12/2018    ALT 70 02/12/2018       POC Tests:   Recent Labs      02/12/18   0525   POCGLU  103*       Coags: No results found for: PROTIME, INR, APTT    HCG (If Applicable): No results found for: PREGTESTUR, PREGSERUM, HCG, HCGQUANT     ABGs:   Lab Results   Component Value Date    PHART 7.110 02/12/2018    PO2ART 252.0 02/12/2018    RQY9HEG 70.0 02/12/2018    FHY0XIH 22.2 02/12/2018    BEART -8.7 02/12/2018    M0XNOYCK 93.2 02/12/2018        Type & Screen (If Applicable):  No results found for: LABABO, [address]    Anesthesia Evaluation  Patient summary reviewed and Nursing notes reviewed no history of anesthetic complications:   Airway:        Comment: intubated   Dental:          Pulmonary:   (+) decreased breath sounds,                             Cardiovascular:                      Neuro/Psych:               GI/Hepatic/Renal:            ROS

## 2018-02-12 NOTE — ANESTHESIA POSTPROCEDURE EVALUATION
Department of Anesthesiology  Postprocedure Note    Patient: Bonne Duverney  MRN: [id number]  YOB: 1965  Date of evaluation: 2/12/2018  Time:  8:50 AM     Procedure Summary     Date:  02/12/18 Room / Location:  [facility] OR  / [facility] OR    Anesthesia Start:  0601 Anesthesia Stop:  7409    Procedure:  LAPAROTOMY EXPLORATORY, TOTAL ABDOMINAL COLECTOMY (N/A ) Diagnosis:  (Perforated bowel)    Surgeon:  Agapito Monique MD Responsible Provider:  Stacey Pringle CRNA    Anesthesia Type:  general ASA Status:  4 - Emergent          Anesthesia Type: general    Daryl Phase I:      Daryl Phase II:      Last vitals: Reviewed and per EMR flowsheets. Anesthesia Post Evaluation    Patient location during evaluation: ICU  Patient participation: complete - patient cannot participate  Level of consciousness: sedated and ventilated  Pain score: 0  Airway patency: sedated and ventilated. Complications: no  Cardiovascular status: hemodynamically stable  Respiratory status: acceptable  Hydration status: stable  Comments: ICU RN received care. Hemodynamically stable.  RR 15, PEEP 7, FiO2 100%. 99/65, 98% sao2, 82 HR. UOP improving.
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
## 2018-02-12 NOTE — PROGRESS NOTES
Procedure Component Value Units Date/Time     Blood Gas, Arterial [[id number]] (Abnormal) Collected: 02/12/18 0513     Specimen: Blood gases Updated: 02/12/18 0514      pH, Arterial 7.110 (LL)      pCO2, Arterial 70.0 (HH) mmHg       pO2, Arterial 252.0 (H) mmHg       HCO3, Arterial 22.2 mmol/L       Base Excess, Arterial -8.7 (L) mmol/L       Hemoglobin, Art, Extended 14.8 g/dL       O2 Sat, Arterial 93.2 %       Carboxyhgb, Arterial 1.4 %       Methemoglobin, Arterial 3.2 (H) %       O2 Content, Arterial 20.0 mL/dL       O2 Therapy Unknown     Narrative:       CALL  DR PITO Dewey MD, 02/12/2018 05:14, by Sagar Whitman     Potassium, Whole Blood [[id number]] Collected: 02/12/18 0513      Updated: 02/12/18 0514      Potassium, Whole Blood 6.1     Narrative:       CALL  DR Kareen Dewey MD, 02/12/2018 05:14, by Sagar Whitman     Lactic Acid, Plasma [[id number]] Collected: 02/12/18 0502              BAGGED %  RIGHT RADIAL AT+  PLACED BACK ON VENT

## 2018-02-13 PROBLEM — K72.00 ISCHEMIC HEPATITIS: Status: ACTIVE | Noted: 2018-01-01

## 2018-02-13 PROBLEM — E16.2 HYPOGLYCEMIA: Status: ACTIVE | Noted: 2018-01-01

## 2018-02-13 PROBLEM — R50.9 FEVER: Status: ACTIVE | Noted: 2018-01-01

## 2018-02-13 PROBLEM — D72.829 LEUKOCYTOSIS: Status: ACTIVE | Noted: 2018-01-01

## 2018-02-13 PROBLEM — E83.51 HYPOCALCEMIA: Status: ACTIVE | Noted: 2018-01-01

## 2018-02-13 PROBLEM — E87.20 METABOLIC ACIDOSIS: Status: ACTIVE | Noted: 2018-01-01

## 2018-02-13 PROBLEM — E87.5 HYPERKALEMIA: Status: ACTIVE | Noted: 2018-01-01

## 2018-02-13 PROBLEM — D69.6 THROMBOCYTOPENIA (HCC): Status: ACTIVE | Noted: 2018-01-01

## 2018-02-13 PROBLEM — E87.20 LACTIC ACIDOSIS: Status: ACTIVE | Noted: 2018-01-01

## 2018-02-13 NOTE — PROGRESS NOTES
Call from ICU nurse to speak with family about hospice. Pt spouse wants to withdraw care. This nurse spoke with spouse about hospice protocol. Spouse states \"You don't understand he is already gone I just want you to withdraw all this\". This nurse did explain to spouse the that hospice could withdraw care in [facility] [address] allowing family to have some time together. Mrs. Hilary Vera stated \"This is my decision, not my children. Please just let do it here\". Emotional support provided. Spoke with pt nurse Lara Newman and Nurse, Diana Delarosa about  Family wishes.  speaking with family at present time.   Electronically signed by Jolan Sicard, RN on 2/13/2018 at 9:08 AM

## 2018-02-13 NOTE — CONSULTS
Inpatient consult to Nephrology  Consult performed by: Jyoti PAGE  Consult ordered by: Daina Mcmillan  Assessment/Recommendations: Nephrology [facility]) Consult Note      Patient:  Aaliyah Gutierres  YOB: 1965  Date of Service: 2/13/2018  MRN: [id number]   Acct: [de-identified]   Primary Care Physician: Laure Hunt  Advance Directive: Full Code  Admit Date: 2/12/2018       Hospital Day: 1  Referring Provider: Raffy Simmons MD    Patient independently seen and examined, Chart, Consults, Notes, Operative notes, Labs, Cardiology, and Radiology studies reviewed as able. Reason for consultation: Acute kidney injury/hyperkalemia/metabolic acidosis    Subjective:  Aaliyah Gutierres is a 47 y.o. male  whom we were consulted for acute kidney injury/hyperkalemia and metabolic acidosis. Patient was accepted as a transfer from [facility]. He presented there with severe abdominal pain. CT scan of the abdomen shows perforated bowel followed by emergent laparotomy. He was found to have severe fecal peritonitis and colorectal mass/cancer. Patient had peritoneal lavage and his belly was left open and was transferred to ICU. Patient is suffering with multiple organ failure/septic shock. He is currently on 3 vasopressors, 100% FiO2 with 10 of PEEP. He has acute kidney injury/ARDS and severe septic shock. Just before surgery he had one episode of cardiac arrest, needing resuscitation. Patient also has now responding to the painful stimuli and getting EEG. Allergies:  Review of patient's allergies indicates no known allergies.     Medicines:  Current Facility-Administered Medications:  DOBUTamine (DOBUTREX) 500 mg in dextrose 5 % 250 mL infusion, 2.5 mcg/kg/min, Intravenous, Continuous, Lukas Caruso MD, Stopped at 02/13/18 0242  albumin human 25 % solution 25 g, 25 g, Intravenous, Q6H, Lukas Caruso MD, 25 g at 02/13/18 0623  dextrose 50 % solution 25 g, 25 g, Intravenous, PRN, Luna Abernathy MD, 25 g at 02/13/18 0459  glucose (GLUTOSE) 40 % oral gel 15 g, 15 g, Oral, PRN, Luna Abernathy MD  dextrose 50 % solution 12.5 g, 12.5 g, Intravenous, PRN, Luna Abernathy MD   glucagon (rDNA) injection 1 mg, 1 mg, Intramuscular, PRN, Luna Abernathy MD  dextrose 5 % solution, 100 mL/hr, Intravenous, PRN, Luna Abernathy MD  dextrose 50 % solution 25 g, 25 g, Intravenous, Once, Tiera Henao MD  insulin regular (HUMULIN R;NOVOLIN R) injection 10 Units, 10 Units, Intravenous, Once, Tiera Henao MD  calcium gluconate 1 g in dextrose 5 % 250 mL IVPB, 1 g, Intravenous, Once, Tiera Henao MD  meropenem (MERREM) 1 g in dextrose 5 % 100 mL IVPB, 1 g, Intravenous, Q12H, Tiera Henao MD  ipratropium-albuterol (DUONEB) nebulizer solution 1 ampule, 1 ampule, Inhalation, Q4H, Tiera Henao MD  norepinephrine (LEVOPHED) 16 mg in dextrose 5 % 250 mL infusion, 2 mcg/min, Intravenous, Continuous, Agapito Monique MD, Last Rate: 28.1 mL/hr at 02/13/18 0812, 30 mcg/min at 02/13/18 0812  propofol 1000 MG/100ML injection, 10 mcg/kg/min, Intravenous, Titrated, Agapito Monique MD  micafungin [facility]) 150 mg in sodium chloride 0.9 % 100 mL IVPB, 150 mg, Intravenous, Daily, Luna Abernathy MD, 150 mg at 02/12/18 0723  vasopressin 20 Units in sodium chloride 0.9 % 100 mL infusion, 0.04 Units/min, Intravenous, Continuous, Luna Abernathy MD, Last Rate: 12 mL/hr at 02/13/18 0000, 0.04 Units/min at 02/13/18 0000  chlorhexidine (PERIDEX) 0.12 % solution 15 mL, 15 mL, Mouth/Throat, BID, Luna Abernathy MD, 15 mL at 02/12/18 2236  pantoprazole (PROTONIX) injection 40 mg, 40 mg, Intravenous, Daily, Luna Abernathy MD  sodium chloride flush 0.9 % injection 10 mL, 10 mL, Intravenous, 2 times per day, Agapito Monique MD, 10 mL at 02/12/18 2236  sodium chloride flush 0.9 % injection 10 mL, 10 mL, Intravenous, PRN, Agapito Monique MD  acetaminophen (TYLENOL) tablet 650 mg, 650 mg, Oral, values in this interval not displayed. CBC:                 02/12/18 02/12/18 02/13/18                       0923          2109          0242          WBC          11.5*        26.1*        29.0*         HGB          8.7*         11.8*        8.5*          HCT          28.9*        38.0*        27.5*         MCV          89.5         88.2         89.3          PLT          148          112*         68*           LIVER PROFILE:                 02/12/18 02/12/18 02/13/18 0452          2109          0242          AST          97*          2,227*       6,977*        ALT          70*          1,510*       3,963*        BILITOT      0.9          1.8*         2.0*          ALKPHOS      123          76           100           PT/INR: No results for input(s): PROTIME, INR in the last 72 hours. APTT: No results for input(s): APTT in the last 72 hours. BNP:  No results for input(s): BNP in the last 72 hours. Ionized Calcium:No results for input(s): IONCA in the last 72 hours. Magnesium:                02/13/18                       0644          MG           5.2*          Phosphorus:                02/13/18                       0644          PHOS         10.7*         HgbA1C: No results for input(s): LABA1C in the last 72 hours. Hepatic:                 02/12/18 02/12/18 02/13/18 0452 2109          0242          ALKPHOS      123          76           100           ALT          70*          1,510*       3,963*        AST          97*          2,227*       6,977*        PROT         3.2*         2.5*         3.2*          BILITOT      0.9          1.8*         2.0*          LABALBU      1.4*         1.4*         2.6*          Lactic Acid:                 02/13/18                       0247          LACTA        8.7*          Troponin: No results for input(s): CKTOTAL, CKMB, TROPONINT in the last 72 hours.   ABGs: No results for input(s): PH, PCO2, PO2, HCO3, O2SAT in the last 72 hours. CRP:  No results for input(s): CRP in the last 72 hours. Sed Rate:  No results for input(s): SEDRATE in the last 72 hours. Cultures:   No results for input(s): CULTURE in the last 72 hours. No results for input(s): BC, Johnathon Hoang in the last 72 hours. No results for input(s): CXSURG in the last 72 hours. Radiology reports as per the Radiologist  Radiology: Xr Chest Portable    Result Date: 2/13/2018  XR CHEST PORTABLE 2/13/2018 3:30 AM HISTORY: 213 Comparison: Intubated Findings: Lines and tubes are stable in position. Opacities in bilateral lower lungs have increased since the previous study. The cardiomediastinal silhouette and pulmonary vascularity are unchanged. No acute osseous or soft tissue abnormality is noted. Impression: 1. Increasing opacities in bilateral lower lungs may be due to pulmonary edema or pneumonia. Signed by Dr Damaris Lainez on 2/13/2018 7:17 AM    Xr Chest Portable    Result Date: 2/12/2018  Examination. XR CHEST PORTABLE History: ET tube placement. A frontal portable upright view of the chest is obtained. There is no previous study for comparison. The lungs are poorly inflated with areas of discoid atelectasis in the lower lung bilaterally. There is no pleural effusion, pulmonary congestion or pneumothorax. The heart size in the normal range. An endotracheal tube is in a satisfactory position. The nasogastric tube is seen with distal part in the area of the stomach. The distal end is not included in the study. The included part of the abdomen show free intraperitoneal air. There is no bony abnormality. Evidence of pneumoperitoneum. The poor lung expansion but no active cardiopulmonary disease. Endotracheal tube and nasogastric tubes in place. Above study was initially reviewed and reported by stat rad[facility]. I do not find any discrepancies.  The report was communicated with the ICU by the radiologist at [facility]

## 2018-02-13 NOTE — OP NOTE
HEATHERNCLD CHEN [facility] ANH Jacobo [address], [address] [facility]                                 OPERATIVE REPORT    PATIENT NAME: Connie Garcia                     :        1965  MED REC NO:   [id number]                              ROOM:       [facility]  ACCOUNT NO:   [de-identified]                           ADMIT DATE: 2018  PROVIDER:     Raad Alegre MD    DATE OF OPERATION:  2018    PREOPERATIVE DIAGNOSIS:  Acute surgical abdomen. POSTOPERATIVE DIAGNOSES:  1. Completely obstructing rectosigmoid cancer. 2.  Massively distended colon with transverse colon perforation. 3.  Fecal peritonitis. PROCEDURE:  Exploratory laparotomy with total abdominal colectomy. SURGEON:  Raad Alegre MD    INDICATION:  The patient is a 64-year-old gentleman transferred by  helicopter from [facility] in [place], [place] with an acute  surgical abdomen. Shortly after arrival to our Intensive Care Unit, he  underwent cardiac arrest, but was resuscitated with ACLS protocol and chest  compression. After further resuscitation and stabilization, he is brought  emergently to the operating room for exploration. OPERATIVE PROCEDURE:  The patient was taken to the main operating room,  placed on the operating table supine. After the induction of adequate  general endotracheal anesthesia and placement of monitoring lines, the  abdomen was prepped and draped to a sterile field. Time-out was  undertaken. Generous midline incision was made and carried through the subcutaneous  tissue to the midline fascia. Fascia was incised. I nicked the peritoneum  with a knife and could hear the gas escape that had a foul odor to it. I  extended this nick along the peritoneum for about an inch and it was an  immediate outpouring of thick liquid stool, which had the consistency of  split pea soup.   We will able to remove the majority of it with the table  suction and at one point had removed 2800 mL. I then opened the abdomen  along the full length of the incision and there was additional stool  present within the abdomen such that the entire abdominal cavity was  covered in thick viscous liquid stool. I explored the abdomen and brought  the small bowel up. It was erythematous and had some staining on the  surface, but over the course of it, there was no perforation and it  appeared viable. The colon was palpated and I found a large hole measuring  about 3 to 4 inches in the midtransverse colon. As we manipulated the  colon, additional stool just simply poured forth as the colon was massively  dilated and necrotic. Ring retractor system was placed and exposure held. I brought up the  terminal ileum, made an opening in the mesentery, placed a SABIHA stapler with  a blue load and fired it to divide the bowel just proximal to the ileocecal  valve. I then mobilized the right colon by bringing it up toward the  midline scoring along the line of Toldt and then further bringing up the  mesentery and placing it on stretch. I divided the mesentery using the  large jaw LigaSure with good result. As I came around the hepatic flexure,  I took down several adhesions in the area of the gallbladder and took care  to avoid injury to the duodenum. I repositioned the retractor system and  then worked on the transverse colon. I released the gastrocolic omentum by  first making an opening and then using the large jaw LigaSure, I came  across the omentum releasing the stomach from the transverse colon. This  allowed me to lift up on the transverse colon and divide the mesentery with  a large jaw LigaSure coming over toward the splenic flexure. I then went down to the line of Toldt along the proximal sigmoid and I  scored this up the left gutter.   In doing so, I palpated down into the  pelvis and felt a firm completely obstructing mass consistent with a  completely obstructing

## 2018-02-13 NOTE — PROGRESS NOTES
Pharmacy Renal Dose Adjustment      Recent Labs      02/13/18   0242  02/13/18 0644   BUN  46*  50*       Recent Labs      02/13/18   0242  02/13/18   0644   CREATININE  2.4*  2.7*       Estimated Creatinine Clearance: 38 mL/min (based on SCr of 2.7 mg/dL). Plan: Changed Merrem to 1 gm IV q12h due to patients renal function. Pharmacy will continue to follow and make adjustments as needed.     Electronically signed by Joanie Easton, [address] [place] [address] on 2/13/2018 at 8:06 AM

## 2018-02-13 NOTE — PROGRESS NOTES
At approximately 2100, spoke with pt's family regarding pt's increased need for high doses of his vasopressors and decreased oxygen levels per ABGs. Brought up code status with wife and children. Wife stated that she \"hadn't thought that far\" when asked if she would still like everything done in the event of a code. Wife visibly upset and left pt's room. Children remained and given more information regarding the code \"process\" and options. Dr. Mp Quiñones in unit at approximately 2130. He spoke with pt's wife and family. Wife was still upset and said she felt like she was being \"pressured. \" She was reassured that there was no pressure being put on her, it was just a topic that needed to be covered. No change in code status at this time.

## 2018-02-13 NOTE — PROGRESS NOTES
MD Zuly   15 mL at 02/12/18 2236    pantoprazole (PROTONIX) injection 40 mg  40 mg Intravenous Daily Lukas Caruso MD        sodium chloride flush 0.9 % injection 10 mL  10 mL Intravenous 2 times per day Jackie York MD   10 mL at 02/12/18 2236    sodium chloride flush 0.9 % injection 10 mL  10 mL Intravenous PRN Jackie York MD        acetaminophen (TYLENOL) tablet 650 mg  650 mg Oral Q4H PRN Jackie York MD        morphine injection 2 mg  2 mg Intravenous Q30 Min PRN Jackie York MD        ondansetron [facility]) injection 4 mg  4 mg Intravenous Q6H PRN Jackie York MD        sodium bicarbonate 100 mEq in dextrose 5 % and 0.45 % NaCl 1,000 mL infusion   Intravenous Continuous Master Reyes  mL/hr at 02/13/18 0131      DOPamine (INTROPIN) 400 mg in dextrose 5 % 250 mL infusion  2.5 mcg/kg/min Intravenous Continuous Master Reyes MD 39.8 mL/hr at 02/13/18 0621 10 mcg/kg/min at 02/13/18 0942    hydrocortisone sodium succinate PF (SOLU-CORTEF) injection 50 mg  50 mg Intravenous Q6H Lukas Caruso MD   50 mg at 02/13/18 0332    EPINEPHrine (EPINEPHrine HCL) 5 mg in dextrose 5 % 250 mL infusion  1 mcg/min Intravenous Continuous Levno Villanueva MD 12 mL/hr at 02/13/18 0254 4 mcg/min at 02/13/18 0254       Physical Exam:  BP (!) 120/54   Pulse 77   Temp 99.1 °F (37.3 °C) (Temporal)   Resp 22   Ht 5' 8\" (1.727 m)   Wt 236 lb 6.4 oz (107.2 kg)   SpO2 (!) 86%   BMI 35.94 kg/m²   24hr I & O:    Intake/Output Summary (Last 24 hours) at 02/13/18 5258  Last data filed at 02/13/18 4947   Gross per 24 hour   Intake           9294.4 ml   Output             6183 ml   Net           3111.4 ml     General appearance: not responsive appears stated age, cooperative and no distress  Head: Normocephalic, without obvious abnormality, atraumatic  Eyes: conjunctivae/corneas clear.  PERRL, no jaundice   Ears: normal external ears  Neck: no adenopathy, no carotid bruit, no JVD, supple, symmetrical, trachea midline and thyroid not enlarged, symmetric, no tenderness/mass/nodules  Lungs: some crackles in the bases  Heart: regular rate and rhythm, S1, S2 normal, no murmur,  No thrill palpable   Abdomen:ABTHERRA VAC system in place, no bowel sounds  Extremities:Pedal edema Trace  Homans sign is negative, no sign of DVT  Skin: Skin color, texture, turgor normal. No rashes or lesions  Lymphatic: No palpable lymph node enlargment  Neurologic: Pupils react, no cough or gag on suctioning  Psychiatric:  Cannot assess    Labs:   Recent Labs      02/12/18   0923 02/12/18 2109 02/13/18 0242   WBC  11.5*  26.1*  29.0*   RBC  3.23*  4.31*  3.08*   HGB  8.7*  11.8*  8.5*   HCT  28.9*  38.0*  27.5*   PLT  148  112*  68*     Recent Labs      02/12/18 2109 02/13/18 0242 02/13/18   0311  02/13/18   0315  02/13/18   0644   NA  130*   --   128*   --    --   126*   K  5.4*   < >  6.0*  5.8  5.9  6.6*   ANIONGAP  17   --   18   --    --   20*   CL  94*   --   90*   --    --   87*   CO2  19*   --   20*   --    --   19*   BUN  43*   --   46*   --    --   50*   CREATININE  1.9*   --   2.4*   --    --   2.7*   GLUCOSE  117*   --   129*   --    --   145*   CALCIUM  5.9*   --   6.3*   --    --   5.9*    < > = values in this interval not displayed. No results for input(s): MG, PHOS in the last 72 hours. Recent Labs      02/12/18   0452  02/12/18 2109 02/13/18 0242   AST  97*  2,227*  6,977*   ALT  70*  1,510*  3,963*   BILITOT  0.9  1.8*  2.0*   ALKPHOS  123  76  100     HgBA1c:  No components found for: HGBA1C  Troponin T: No results for input(s): TROPONINI in the last 72 hours. Pro-BNP: No results for input(s): BNP in the last 72 hours. INR: No results for input(s): INR in the last 72 hours.   ABGs: Lab Results   Component Value Date    PHART 7.180 02/13/2018    PO2ART 48.0 02/13/2018    OAJ5RBO 55.0 02/13/2018     UA:  Recent Labs      02/12/18   0941   COLORU  RED*   PHUR  6.0   WBCUA  3-5*

## 2018-02-13 NOTE — PROGRESS NOTES
Vasopressors turned off. Ventillator currently on awaiting extubation order. Dr. Mica York, Dr. Van Peguero and Dr. Nara Myers notified of plans to withdraw care. Family all at bedside requesting staff to turn off gtts, vent and monitor.

## 2018-02-13 NOTE — PROGRESS NOTES
Palliative Care visited with wife and family. They do not want to move forward with hospice but just withdraw care at the bedside. Dr. Karel Alicea notified.

## 2018-02-13 NOTE — PROGRESS NOTES
2/13/2018  1:01 AM - Chuck Lorenzo Incoming Lab Results From [facility]     Component Results     Component Value Ref Range & Units Status Collected Lab   pH, Arterial 7.230   7.350 - 7.450 Final 02/13/2018 12:59 AM [facility] Lab   pCO2, Arterial 46.0   35.0 - 45.0 mmHg Final 02/13/2018 12:59 AM [facility] Lab   pO2, Arterial 48.0   80.0 - 100.0 mmHg Final 02/13/2018 12:59 AM [facility] Lab   HCO3, Arterial 19.3   22.0 - 26.0 mmol/L Final 02/13/2018 12:59 AM [facility] Excess, Arterial -8.0   -2.0 - 2.0 mmol/L Final 02/13/2018 12:59 AM [facility] Lab   Hemoglobin, Art, Extended 10.5   14.0 - 18.0 g/dL Final 02/13/2018 12:59 AM [facility] Lab   O2 Sat, Arterial 81.1   >92 % Final 02/13/2018 12:59 AM [facility] Lab   Carboxyhgb, Arterial 0.7  0.0 - 5.0 % Final 02/13/2018 12:59 AM [facility] Lab        0.0-1.5   (Smokers 1.5-5.0)    Methemoglobin, Arterial 3.9   <1.5 % Final 02/13/2018 12:59 AM [facility] Lab   O2 Content, Arterial 12.0  Not Established mL/dL Final 02/13/2018 12:59 AM [address] [place] [address] Lab   O2 Therapy Unknown   Final 02/13/2018 12:59 AM [facility] Lab   Testing Performed By     Lab - Abbreviation Name     Claudia  [facility] 22 580 +10 PEEP, 100%

## (undated) DEVICE — SOLUTION IV IRRIG POUR BRL 0.9% SODIUM CHL 2F7124

## (undated) DEVICE — SUTURE PERMAHAND SZ 3-0 L18IN NONABSORBABLE BLK L26MM SH C013D

## (undated) DEVICE — DRESSING NEG PRSS OPN ABD FEN VIS PROTCT LAYR PERF FOAM DRP 3706055S] KCI THERAPEUTIC SERVICES]

## (undated) DEVICE — GLOVE ORTHO 8   MSG9480

## (undated) DEVICE — SUTURE PDS + SZ 1 L96IN ABSRB VLT L65MM TP-1 1/2 CIR PDP880G

## (undated) DEVICE — TOWEL,OR,DSP,ST,BLUE,DLX,4/PK,20PK/CS: Brand: MEDLINE

## (undated) DEVICE — STAPLER INT L75MM CUT LN L73MM STPL LN L77MM BLU B FRM 8

## (undated) DEVICE — RELOAD STPL L55MM OPN H3.8MM CLS H1.5MM WIRE DIA0.2MM REG

## (undated) DEVICE — SEALER ENDOSCP NANO COAT OPN DIV CRV L JAW LIGASURE IMPACT

## (undated) DEVICE — SUTURE VCRL SZ 3-0 L27IN ABSRB UD L26MM SH 1/2 CIR J416H

## (undated) DEVICE — MAJOR CDS

## (undated) DEVICE — GLOVE SURG SZ 75 L12IN FNGR THK94MIL TRNSLUC YEL LTX

## (undated) DEVICE — E-Z CLEAN, NON-STICK, PTFE COATED, ELECTROSURGICAL BLADE ELECTRODE, MODIFIED EXTENDED INSULATION, 6.5 INCH (16.5 CM): Brand: MEGADYNE

## (undated) DEVICE — SUTURE VCRL SZ 0 L54IN ABSRB UD LIGAPAK REEL NDL J287G

## (undated) DEVICE — POOLE SUCTION INSTRUMENT WITH REMOVABLE SHEATH: Brand: POOLE

## (undated) DEVICE — TIBURON LAPAROTOMY DRAPE: Brand: CONVERTORS

## (undated) DEVICE — STAPLER INT L55MM CUT LN L53MM STPL LN L57MM BLU B FRM 8

## (undated) DEVICE — SPONGE LAP W18XL18IN WHT COT 4 PLY FLD STRUNG RADPQ DISP ST